# Patient Record
Sex: FEMALE | Race: WHITE | HISPANIC OR LATINO
[De-identification: names, ages, dates, MRNs, and addresses within clinical notes are randomized per-mention and may not be internally consistent; named-entity substitution may affect disease eponyms.]

---

## 2017-07-17 VITALS
WEIGHT: 185 LBS | SYSTOLIC BLOOD PRESSURE: 119 MMHG | RESPIRATION RATE: 16 BRPM | HEART RATE: 69 BPM | BODY MASS INDEX: 34.04 KG/M2 | TEMPERATURE: 96.8 F | DIASTOLIC BLOOD PRESSURE: 85 MMHG | HEIGHT: 62 IN

## 2017-11-01 VITALS
WEIGHT: 179 LBS | HEART RATE: 95 BPM | BODY MASS INDEX: 32.94 KG/M2 | DIASTOLIC BLOOD PRESSURE: 86 MMHG | SYSTOLIC BLOOD PRESSURE: 110 MMHG | HEIGHT: 62 IN

## 2017-11-28 ENCOUNTER — RECORD ABSTRACTING (OUTPATIENT)
Age: 42
End: 2017-11-28

## 2017-11-28 DIAGNOSIS — E11.9 TYPE 2 DIABETES MELLITUS W/OUT COMPLICATIONS: ICD-10-CM

## 2017-11-28 DIAGNOSIS — Z86.39 PERSONAL HISTORY OF OTHER ENDOCRINE, NUTRITIONAL AND METABOLIC DISEASE: ICD-10-CM

## 2017-12-21 ENCOUNTER — APPOINTMENT (OUTPATIENT)
Dept: ENDOCRINOLOGY | Facility: CLINIC | Age: 42
End: 2017-12-21
Payer: COMMERCIAL

## 2017-12-21 VITALS
HEIGHT: 62 IN | WEIGHT: 180 LBS | RESPIRATION RATE: 16 BRPM | HEART RATE: 73 BPM | BODY MASS INDEX: 33.13 KG/M2 | TEMPERATURE: 98 F | OXYGEN SATURATION: 99 % | SYSTOLIC BLOOD PRESSURE: 116 MMHG | DIASTOLIC BLOOD PRESSURE: 70 MMHG

## 2017-12-21 DIAGNOSIS — E78.5 HYPERLIPIDEMIA, UNSPECIFIED: ICD-10-CM

## 2017-12-21 PROCEDURE — 99214 OFFICE O/P EST MOD 30 MIN: CPT

## 2018-01-06 ENCOUNTER — RX RENEWAL (OUTPATIENT)
Age: 43
End: 2018-01-06

## 2018-02-22 ENCOUNTER — APPOINTMENT (OUTPATIENT)
Dept: ENDOCRINOLOGY | Facility: CLINIC | Age: 43
End: 2018-02-22

## 2018-03-02 ENCOUNTER — RX RENEWAL (OUTPATIENT)
Age: 43
End: 2018-03-02

## 2018-03-19 ENCOUNTER — APPOINTMENT (OUTPATIENT)
Dept: ENDOCRINOLOGY | Facility: CLINIC | Age: 43
End: 2018-03-19

## 2018-04-06 ENCOUNTER — APPOINTMENT (OUTPATIENT)
Dept: ENDOCRINOLOGY | Facility: CLINIC | Age: 43
End: 2018-04-06

## 2018-04-09 ENCOUNTER — RX RENEWAL (OUTPATIENT)
Age: 43
End: 2018-04-09

## 2018-04-09 RX ORDER — LEVOTHYROXINE SODIUM 0.2 MG/1
200 TABLET ORAL
Qty: 30 | Refills: 3 | Status: COMPLETED | COMMUNITY
Start: 2017-12-21 | End: 2018-04-09

## 2018-08-16 ENCOUNTER — RX RENEWAL (OUTPATIENT)
Age: 43
End: 2018-08-16

## 2018-08-27 ENCOUNTER — RX RENEWAL (OUTPATIENT)
Age: 43
End: 2018-08-27

## 2018-09-25 ENCOUNTER — APPOINTMENT (OUTPATIENT)
Dept: ENDOCRINOLOGY | Facility: CLINIC | Age: 43
End: 2018-09-25
Payer: COMMERCIAL

## 2018-09-25 VITALS
BODY MASS INDEX: 30.55 KG/M2 | WEIGHT: 166 LBS | SYSTOLIC BLOOD PRESSURE: 155 MMHG | TEMPERATURE: 98.6 F | OXYGEN SATURATION: 97 % | RESPIRATION RATE: 16 BRPM | DIASTOLIC BLOOD PRESSURE: 95 MMHG | HEART RATE: 88 BPM | HEIGHT: 62 IN

## 2018-09-25 VITALS — SYSTOLIC BLOOD PRESSURE: 131 MMHG | DIASTOLIC BLOOD PRESSURE: 81 MMHG

## 2018-09-25 DIAGNOSIS — I10 ESSENTIAL (PRIMARY) HYPERTENSION: ICD-10-CM

## 2018-09-25 LAB — GLUCOSE BLDC GLUCOMTR-MCNC: 221

## 2018-09-25 PROCEDURE — 99214 OFFICE O/P EST MOD 30 MIN: CPT | Mod: 25

## 2018-09-25 PROCEDURE — 82962 GLUCOSE BLOOD TEST: CPT

## 2018-09-25 RX ORDER — CHOLECALCIFEROL (VITAMIN D3) 50 MCG
50 MCG TABLET ORAL
Qty: 90 | Refills: 3 | Status: ACTIVE | COMMUNITY
Start: 1900-01-01 | End: 1900-01-01

## 2018-09-25 RX ORDER — GLYBURIDE 5 MG/1
5 TABLET ORAL
Refills: 0 | Status: COMPLETED | COMMUNITY
End: 2018-09-25

## 2018-09-26 ENCOUNTER — TRANSCRIPTION ENCOUNTER (OUTPATIENT)
Age: 43
End: 2018-09-26

## 2018-10-26 ENCOUNTER — RX RENEWAL (OUTPATIENT)
Age: 43
End: 2018-10-26

## 2018-10-26 ENCOUNTER — OTHER (OUTPATIENT)
Age: 43
End: 2018-10-26

## 2018-11-29 ENCOUNTER — RX RENEWAL (OUTPATIENT)
Age: 43
End: 2018-11-29

## 2019-01-11 ENCOUNTER — RX CHANGE (OUTPATIENT)
Age: 44
End: 2019-01-11

## 2019-01-11 RX ORDER — LEVOTHYROXINE SODIUM 0.09 MG/1
88 TABLET ORAL DAILY
Qty: 90 | Refills: 3 | Status: COMPLETED | COMMUNITY
Start: 2018-08-27 | End: 2019-01-11

## 2019-01-11 RX ORDER — LEVOTHYROXINE SODIUM 0.15 MG/1
150 TABLET ORAL
Qty: 30 | Refills: 3 | Status: COMPLETED | COMMUNITY
Start: 2018-10-26 | End: 2019-01-11

## 2019-03-18 ENCOUNTER — RX RENEWAL (OUTPATIENT)
Age: 44
End: 2019-03-18

## 2019-03-19 ENCOUNTER — MEDICATION RENEWAL (OUTPATIENT)
Age: 44
End: 2019-03-19

## 2019-04-24 ENCOUNTER — RX RENEWAL (OUTPATIENT)
Age: 44
End: 2019-04-24

## 2019-05-15 ENCOUNTER — RX RENEWAL (OUTPATIENT)
Age: 44
End: 2019-05-15

## 2019-05-21 ENCOUNTER — RX RENEWAL (OUTPATIENT)
Age: 44
End: 2019-05-21

## 2019-05-21 RX ORDER — CANAGLIFLOZIN 100 MG/1
100 TABLET, FILM COATED ORAL DAILY
Qty: 90 | Refills: 3 | Status: COMPLETED | COMMUNITY
Start: 1900-01-01 | End: 2019-05-21

## 2019-05-25 ENCOUNTER — RX RENEWAL (OUTPATIENT)
Age: 44
End: 2019-05-25

## 2019-05-30 ENCOUNTER — APPOINTMENT (OUTPATIENT)
Dept: ENDOCRINOLOGY | Facility: CLINIC | Age: 44
End: 2019-05-30

## 2019-06-24 ENCOUNTER — APPOINTMENT (OUTPATIENT)
Dept: ENDOCRINOLOGY | Facility: CLINIC | Age: 44
End: 2019-06-24
Payer: COMMERCIAL

## 2019-06-24 VITALS
DIASTOLIC BLOOD PRESSURE: 88 MMHG | HEART RATE: 112 BPM | SYSTOLIC BLOOD PRESSURE: 122 MMHG | OXYGEN SATURATION: 89 % | WEIGHT: 178 LBS | HEIGHT: 62 IN | BODY MASS INDEX: 32.76 KG/M2 | TEMPERATURE: 98.5 F | RESPIRATION RATE: 16 BRPM

## 2019-06-24 PROCEDURE — 99214 OFFICE O/P EST MOD 30 MIN: CPT

## 2019-06-24 NOTE — PHYSICAL EXAM
[Alert] : alert [No Acute Distress] : no acute distress [Normal Sclera/Conjunctiva] : normal sclera/conjunctiva [PERRL] : pupils equal, round and reactive to light [Normal Outer Ear/Nose] : the ears and nose were normal in appearance [Normal Hearing] : hearing was normal [No Neck Mass] : no neck mass was observed [de-identified] : surgical scar, non tender

## 2019-06-24 NOTE — DATA REVIEWED
[No studies available for review at this time.] : No studies available for review at this time. [FreeTextEntry1] : The TSH is suppressed and the Free T4 is normal with low Tg

## 2019-06-24 NOTE — ASSESSMENT
[FreeTextEntry1] : Will repeat the Metastatic survey and ablation treatment if necessary\par To see Dr. Toscano for FNA biopsy thyroid lesion\par The diabetes is not well controlled\par Will start Glipizide ER 2.5 mg po QD\par Her LDL-C is elevated\par Will start Atorvastatin 10 mg po QD\par Will continue the same Levothyroxine dose

## 2019-06-24 NOTE — HISTORY OF PRESENT ILLNESS
[FreeTextEntry1] : Patient feels well but she is under stress, the last US thyroid revealed small nodule she did not see Dr. Toscano for the FNA biopsy. The TSH is well suppressed and the Free T4 is normal The Tg is low. She has had 2 metastatic surveys. The FBS and HbA1c are slightly elevated.

## 2019-09-19 ENCOUNTER — RX RENEWAL (OUTPATIENT)
Age: 44
End: 2019-09-19

## 2019-11-14 ENCOUNTER — APPOINTMENT (OUTPATIENT)
Dept: ENDOCRINOLOGY | Facility: CLINIC | Age: 44
End: 2019-11-14
Payer: COMMERCIAL

## 2019-11-14 PROCEDURE — 99214 OFFICE O/P EST MOD 30 MIN: CPT

## 2019-11-14 NOTE — HISTORY OF PRESENT ILLNESS
[FreeTextEntry1] : Patient feels well but the diabetes is not well controlled. The patient saw the Head and Neck surgeon Dr. Toscano, she has small lymph nodes in the US thyroid. Will order a PET scan whole body. Denies polyuria, polydipsia. No chest pain or palpitations. She has gained weight.

## 2019-11-14 NOTE — ASSESSMENT
[FreeTextEntry1] : The diabetes is not well controlled\par Patient is clinically euthyroid\par The US thyroid disclosed small lymph nodes.\par Will order a PET scan Whole Body\par Will order a diabetic work up\par Will order thyroid test and Tg\par Will continue same treatment until we get the results

## 2019-11-14 NOTE — PHYSICAL EXAM
[Alert] : alert [Normal Sclera/Conjunctiva] : normal sclera/conjunctiva [No Acute Distress] : no acute distress [Normal Hearing] : hearing was normal [Normal Outer Ear/Nose] : the ears and nose were normal in appearance [PERRL] : pupils equal, round and reactive to light [No Neck Mass] : no neck mass was observed [de-identified] : surgical scar, non tender

## 2019-11-19 ENCOUNTER — TRANSCRIPTION ENCOUNTER (OUTPATIENT)
Age: 44
End: 2019-11-19

## 2019-12-23 ENCOUNTER — APPOINTMENT (OUTPATIENT)
Dept: SURGERY | Facility: CLINIC | Age: 44
End: 2019-12-23
Payer: COMMERCIAL

## 2019-12-23 VITALS
WEIGHT: 177 LBS | OXYGEN SATURATION: 93 % | DIASTOLIC BLOOD PRESSURE: 82 MMHG | TEMPERATURE: 98.4 F | HEIGHT: 62 IN | BODY MASS INDEX: 32.57 KG/M2 | HEART RATE: 73 BPM | SYSTOLIC BLOOD PRESSURE: 116 MMHG

## 2019-12-23 DIAGNOSIS — Z83.3 FAMILY HISTORY OF DIABETES MELLITUS: ICD-10-CM

## 2019-12-23 DIAGNOSIS — Z78.9 OTHER SPECIFIED HEALTH STATUS: ICD-10-CM

## 2019-12-23 DIAGNOSIS — Z72.89 OTHER PROBLEMS RELATED TO LIFESTYLE: ICD-10-CM

## 2019-12-23 PROCEDURE — 99243 OFF/OP CNSLTJ NEW/EST LOW 30: CPT

## 2019-12-23 NOTE — PLAN
[FreeTextEntry1] : Ms. REMI GARY  is presenting  today for an evaluation .  she is doing well and offers no complaints.  Results of  her  physical examination findings were discussed in details.   She  was advised to have BL    breast US and Mammogram and return after the tests. Importance of monthly self-breast examination was reinforced.  Patient's questions and concerns addressed to patient's satisfaction.\par \par Vitamin E daily for breast pain \par Avoid caffein and Chocolates to prevent breast pain

## 2019-12-23 NOTE — PHYSICAL EXAM
[Oriented to Time] : oriented to time [Oriented to Place] : oriented to place [Oriented to Person] : oriented to person [Alert] : alert [Calm] : calm [de-identified] : She  is alert, well-groomed, and cheerful.\par   [de-identified] :   anicteric.  Nasal mucosa pink, septum midline. Oral mucosa pink.  Tongue midline, Pharynx without exudates.\par   [de-identified] : No chest deformity. Breast are symmetric, Normal contours. No nodules, masses, tenderness, or axillary or supraclavicular  adenopathy. No nipple discharge. no skin retraction \par   [de-identified] :  Neck supple. Trachea midline. Thyroid isthmus barely palpable, lobes not felt.\par

## 2019-12-23 NOTE — CONSULT LETTER
[Dear  ___] : Dear  [unfilled], [Consult Letter:] : I had the pleasure of evaluating your patient, [unfilled]. [Consult Closing:] : Thank you very much for allowing me to participate in the care of this patient.  If you have any questions, please do not hesitate to contact me. [Please see my note below.] : Please see my note below. [FreeTextEntry3] : Jessee Tavarez MD, FACS  [Sincerely,] : Sincerely,

## 2019-12-23 NOTE — HISTORY OF PRESENT ILLNESS
[de-identified] : Patient is a 44 year  year-old female  who was referred by Dr. Donis Us with the chief complaint of having a  Right  breast  pain for about 10 days . She  denies any trauma and She has no nipple discharge. She  denies any fever, night sweats or loss of appetite.    There is no family history of breast carcinoma.   Menarche at age 9 -3 para-1, and her last menstrual period was in 2019 Patient is on no hormonal replacement therapy.   Patient  had a mammogram  and Sono in 2019. she was told it was normal. she did not bring the report.

## 2020-01-23 ENCOUNTER — APPOINTMENT (OUTPATIENT)
Dept: SURGERY | Facility: CLINIC | Age: 45
End: 2020-01-23
Payer: COMMERCIAL

## 2020-01-23 VITALS
BODY MASS INDEX: 32.76 KG/M2 | OXYGEN SATURATION: 95 % | WEIGHT: 178 LBS | HEART RATE: 92 BPM | HEIGHT: 62 IN | SYSTOLIC BLOOD PRESSURE: 108 MMHG | DIASTOLIC BLOOD PRESSURE: 73 MMHG | TEMPERATURE: 98.2 F

## 2020-01-23 PROCEDURE — 99213 OFFICE O/P EST LOW 20 MIN: CPT

## 2020-01-23 NOTE — PLAN
[FreeTextEntry1] :  Ms. REMI GARY  was told significance of findings, options, risks and benefits were explained.  Informed consent for right breast lumpectomy and potential risks, benefits and alternatives (surgical options were discussed including non-surgical options or the option of no surgery) to the planned surgery were discussed in depth.  All surgical options were discussed including non-surgical treatments.  She wishes to proceed with surgery.  We will plan for surgery on at the next available date, pending any required insurance pre-certification or pre-approval. She agrees to obtain any necessary pre-operative evaluations and testing prior to surgery.\par Patient advised to seek immediate medical attention with any acute change in symptoms or with the development of any new or worsening symptoms.  Patient's questions and concerns addressed to patient's satisfaction, and patient verbalized an understanding of the information discussed.\par \par

## 2020-01-23 NOTE — DATA REVIEWED
[FreeTextEntry1] : 	\par Exam requested by:\par MEY DOMINGUEZ MD\par [id number] [facility], GROUND FLOOR\par [facility] [address]\par SITE PERFORMED: [place]\par SITE PHONE: [phone]\par Patient: RAMÓN MAN\par YOB: 1975\par Phone: [phone]\par MRN: [id number] Acc: [id number]\par Date of Exam: 01-\par  \par EXAM:  DIGITAL UNILATERAL RIGHT DIAGNOSTIC MAMMOGRAM WITH CAD AND TOMOSYNTHESIS AND BREAST ULTRASOUND\par \par HISTORY: Right nipple discharge for 2 months. Brown in color as per patient.\par \par CLINICAL BREAST EXAMINATION:  The patient reports that her last clinical breast exam was within the past year. \par \par COMPARISON:  The present examination has been compared to prior breast imaging studies dating back to 2016\par \par MAMMOGRAM:\par \par TECHNIQUE:  Full-field digital mammography of the right breast was obtained. Additional imaging is composed of: Right CC and ML magnification views were obtained.. CAD was utilized. Low-dose full-field digital breast tomosynthesis examination was performed with 3D acquisitions and C-View synthesized 2D reconstructed images.  \par \par FINDINGS:\par BREAST COMPOSITION:  The breasts are heterogeneously dense, which may obscure small masses.\par \par Round/oval equal masses in the right breast appear stable with a parenchymal pattern similar to 2016 mammogram given differences in technique. Diffuse benign-appearing calcifications are seen in the right breast. No suspicious mammographic findings.\par \par BREAST ULTRASOUND:  \par \par TECHNIQUE:  Complete bilateral breast ultrasound, with evaluation of the four quadrants, retroareolar regions and axillae, was performed. \par \par FINDINGS: Simple cyst, minimally complicated cyst, and cluster of microcysts are seen in both breasts. No suspicious sonographic findings. No axillary adenopathy.\par \par IMPRESSION: \par No suspicious mammographic or sonographic findings. Clinical follow-up is recommended for further management of right nipple discharge. \par \par FOLLOW-UP:  Clinical correlation and assessment. \par \par ASSESSMENT:  BI-RADS Category 2:  Benign.\par \par This examination should not preclude the clinical evaluation of a suspicious palpable abnormality.\par \par As per the FDA requirements, a layman's letter has been generated and sent to your patient stating the results and recommendations of this breast imaging study. We have entered your patient into our reminder system and will notify them when they are due for their next breast imaging exam. \par \par Thank you for the opportunity to participate in the care of this patient.  \par  \par Jina Kim MD  - Electronically Signed: 01- 12:00 PM \par Physician to Physician Direct Line is: [phone]\par

## 2020-01-23 NOTE — PHYSICAL EXAM
[Alert] : alert [Oriented to Person] : oriented to person [Oriented to Place] : oriented to place [Oriented to Time] : oriented to time [Calm] : calm [de-identified] :   anicteric.  Nasal mucosa pink, septum midline. Oral mucosa pink.  Tongue midline, Pharynx without exudates.\par   [de-identified] : She  is alert, well-groomed, and cheerful.\par   [de-identified] :  Neck supple. Trachea midline. Thyroid isthmus barely palpable, lobes not felt.\par   [de-identified] : No chest deformity. Breast are symmetric, Normal contours. No nodules, masses, tenderness, or axillary or supraclavicular  adenopathy. No nipple discharge. no skin retraction \par

## 2020-01-23 NOTE — HISTORY OF PRESENT ILLNESS
[de-identified] : This is  a 45 year   old patient presenting today for a breast exam and to discuss the results of her recent  breast imaging. Patient had aright  breast US and   right  breast Mammogram on 01/14/2020. Deemed BIRADS category 2. BL exams were ordered. Radiologist did not do BL because she is due to have it  in April 2020.  today  she reports right breast pain.    Ms. REMI GARY denies any trauma.  she   reports spontaneous right  nipple discharge.  she states she had a surgery 7 years ago for the same symptoms and was found to have intraductal papilloma. Patient denies any fever, night sweats or loss of appetite.    There is no family history of breast carcinoma.  Her last menstrual period was  12/2019.  Patient is on no hormonal replacement therapy.   [de-identified] : Patient reports no interval changes to her overall health status or medical history

## 2020-02-10 ENCOUNTER — OUTPATIENT (OUTPATIENT)
Dept: OUTPATIENT SERVICES | Facility: HOSPITAL | Age: 45
LOS: 1 days | End: 2020-02-10
Payer: COMMERCIAL

## 2020-02-10 VITALS
HEIGHT: 67 IN | RESPIRATION RATE: 16 BRPM | DIASTOLIC BLOOD PRESSURE: 82 MMHG | TEMPERATURE: 97 F | WEIGHT: 179.9 LBS | OXYGEN SATURATION: 98 % | HEART RATE: 89 BPM | SYSTOLIC BLOOD PRESSURE: 112 MMHG

## 2020-02-10 DIAGNOSIS — N64.52 NIPPLE DISCHARGE: ICD-10-CM

## 2020-02-10 DIAGNOSIS — Z98.891 HISTORY OF UTERINE SCAR FROM PREVIOUS SURGERY: Chronic | ICD-10-CM

## 2020-02-10 DIAGNOSIS — Z01.818 ENCOUNTER FOR OTHER PREPROCEDURAL EXAMINATION: ICD-10-CM

## 2020-02-10 DIAGNOSIS — Z98.890 OTHER SPECIFIED POSTPROCEDURAL STATES: Chronic | ICD-10-CM

## 2020-02-10 PROCEDURE — G0463: CPT

## 2020-02-10 RX ORDER — SODIUM CHLORIDE 9 MG/ML
3 INJECTION INTRAMUSCULAR; INTRAVENOUS; SUBCUTANEOUS EVERY 8 HOURS
Refills: 0 | Status: DISCONTINUED | OUTPATIENT
Start: 2020-02-14 | End: 2020-02-22

## 2020-02-10 NOTE — H&P PST ADULT - HISTORY OF PRESENT ILLNESS
45 o female with history of HTN, Thyroid cancer, Hypothyroidism, reports the above.  She is scheduled for Right Breast Lumpectomy on 2/14/20 45 o female with history of HTN, Thyroid cancer, DM, HLD, Hypothyroidism, reports the above.  She is scheduled for Right Breast Lumpectomy on 2/14/20

## 2020-02-10 NOTE — H&P PST ADULT - NSICDXFAMILYHX_GEN_ALL_CORE_FT
FAMILY HISTORY:  FH: hypertension, DM in mother.   Cancer in mother's side of family  FH: type 2 diabetes, Heart attack, HTN, in  father

## 2020-02-10 NOTE — H&P PST ADULT - NSICDXPASTMEDICALHX_GEN_ALL_CORE_FT
PAST MEDICAL HISTORY:  Diabetes mellitus     Hypothyroidism     Thyroid cancer PAST MEDICAL HISTORY:  Diabetes mellitus     Hyperlipidemia     Hypertension     Hypothyroidism     Thyroid cancer

## 2020-02-10 NOTE — H&P PST ADULT - NSANTHOSAYNRD_GEN_A_CORE
No. GERALDO screening performed.  STOP BANG Legend: 0-2 = LOW Risk; 3-4 = INTERMEDIATE Risk; 5-8 = HIGH Risk

## 2020-02-10 NOTE — H&P PST ADULT - REASON FOR ADMISSION
Drainage coming from right breast x 3 months ago, with some pain in right lateral breast.  Patient states had the same surgery in the left breast

## 2020-02-10 NOTE — H&P PST ADULT - NSICDXPASTSURGICALHX_GEN_ALL_CORE_FT
PAST SURGICAL HISTORY:  H/O  section x3    H/O lumpectomy Left breast    H/O ovarian cystectomy     S/P thyroidectomy

## 2020-02-10 NOTE — H&P PST ADULT - NEGATIVE ALLERGY TYPES
no reactions to insect bites/no reactions to animals/no outdoor environmental allergies/no indoor environmental allergies/no reactions to food

## 2020-02-13 ENCOUNTER — TRANSCRIPTION ENCOUNTER (OUTPATIENT)
Age: 45
End: 2020-02-13

## 2020-02-14 ENCOUNTER — APPOINTMENT (OUTPATIENT)
Dept: SURGERY | Facility: HOSPITAL | Age: 45
End: 2020-02-14

## 2020-02-14 ENCOUNTER — OUTPATIENT (OUTPATIENT)
Dept: OUTPATIENT SERVICES | Facility: HOSPITAL | Age: 45
LOS: 1 days | End: 2020-02-14
Payer: COMMERCIAL

## 2020-02-14 ENCOUNTER — RESULT REVIEW (OUTPATIENT)
Age: 45
End: 2020-02-14

## 2020-02-14 VITALS
SYSTOLIC BLOOD PRESSURE: 127 MMHG | TEMPERATURE: 99 F | OXYGEN SATURATION: 96 % | RESPIRATION RATE: 16 BRPM | DIASTOLIC BLOOD PRESSURE: 80 MMHG | HEART RATE: 88 BPM

## 2020-02-14 VITALS
SYSTOLIC BLOOD PRESSURE: 129 MMHG | DIASTOLIC BLOOD PRESSURE: 94 MMHG | WEIGHT: 179.9 LBS | TEMPERATURE: 98 F | HEART RATE: 105 BPM | HEIGHT: 67 IN | OXYGEN SATURATION: 99 % | RESPIRATION RATE: 18 BRPM

## 2020-02-14 DIAGNOSIS — Z98.890 OTHER SPECIFIED POSTPROCEDURAL STATES: Chronic | ICD-10-CM

## 2020-02-14 DIAGNOSIS — N64.52 NIPPLE DISCHARGE: ICD-10-CM

## 2020-02-14 DIAGNOSIS — Z98.891 HISTORY OF UTERINE SCAR FROM PREVIOUS SURGERY: Chronic | ICD-10-CM

## 2020-02-14 LAB
GLUCOSE BLDC GLUCOMTR-MCNC: 125 MG/DL — HIGH (ref 70–99)
HCG UR QL: NEGATIVE — SIGNIFICANT CHANGE UP

## 2020-02-14 PROCEDURE — 88307 TISSUE EXAM BY PATHOLOGIST: CPT

## 2020-02-14 PROCEDURE — 19301 PARTIAL MASTECTOMY: CPT | Mod: AS,RT

## 2020-02-14 PROCEDURE — 88307 TISSUE EXAM BY PATHOLOGIST: CPT | Mod: 26

## 2020-02-14 PROCEDURE — 82962 GLUCOSE BLOOD TEST: CPT

## 2020-02-14 PROCEDURE — 81025 URINE PREGNANCY TEST: CPT

## 2020-02-14 PROCEDURE — 19301 PARTIAL MASTECTOMY: CPT | Mod: RT

## 2020-02-14 PROCEDURE — 19120 REMOVAL OF BREAST LESION: CPT | Mod: RT

## 2020-02-14 RX ORDER — HYDROMORPHONE HYDROCHLORIDE 2 MG/ML
0.5 INJECTION INTRAMUSCULAR; INTRAVENOUS; SUBCUTANEOUS
Refills: 0 | Status: DISCONTINUED | OUTPATIENT
Start: 2020-02-14 | End: 2020-02-14

## 2020-02-14 RX ORDER — ONDANSETRON 8 MG/1
4 TABLET, FILM COATED ORAL ONCE
Refills: 0 | Status: DISCONTINUED | OUTPATIENT
Start: 2020-02-14 | End: 2020-02-14

## 2020-02-14 RX ORDER — ACETAMINOPHEN WITH CODEINE 300MG-30MG
1 TABLET ORAL
Qty: 10 | Refills: 0
Start: 2020-02-14

## 2020-02-14 RX ORDER — FENTANYL CITRATE 50 UG/ML
25 INJECTION INTRAVENOUS
Refills: 0 | Status: DISCONTINUED | OUTPATIENT
Start: 2020-02-14 | End: 2020-02-14

## 2020-02-14 RX ORDER — SODIUM CHLORIDE 9 MG/ML
1000 INJECTION, SOLUTION INTRAVENOUS
Refills: 0 | Status: DISCONTINUED | OUTPATIENT
Start: 2020-02-14 | End: 2020-02-14

## 2020-02-14 NOTE — ASU DISCHARGE PLAN (ADULT/PEDIATRIC) - ASU DC SPECIAL INSTRUCTIONSFT
follow-up with Dr. Tavarez in 1-2 weeks follow-up with Dr. Tavarez in 1-2 weeks  Do not lift arm above head until cleared by Dr Tavarez

## 2020-02-14 NOTE — ASU DISCHARGE PLAN (ADULT/PEDIATRIC) - CARE PROVIDER_API CALL
Jessee Tavarez)  Surgery  [address] [facility], [place] [address]  Phone: [phone]  Fax: [phone]  Follow Up Time:

## 2020-02-19 LAB — SURGICAL PATHOLOGY STUDY: SIGNIFICANT CHANGE UP

## 2020-02-24 ENCOUNTER — APPOINTMENT (OUTPATIENT)
Dept: SURGERY | Facility: CLINIC | Age: 45
End: 2020-02-24
Payer: COMMERCIAL

## 2020-02-24 VITALS
WEIGHT: 176 LBS | OXYGEN SATURATION: 98 % | BODY MASS INDEX: 32.39 KG/M2 | SYSTOLIC BLOOD PRESSURE: 110 MMHG | HEIGHT: 62 IN | TEMPERATURE: 98.3 F | DIASTOLIC BLOOD PRESSURE: 76 MMHG | HEART RATE: 77 BPM

## 2020-02-24 DIAGNOSIS — N64.52 NIPPLE DISCHARGE: ICD-10-CM

## 2020-02-24 PROCEDURE — 99024 POSTOP FOLLOW-UP VISIT: CPT

## 2020-02-24 NOTE — PHYSICAL EXAM
[de-identified] : She  is alert, well-groomed, and cheerful.\par   [de-identified] : right breast Surgical wound is healing well.   no signs of  inflammation or infection.

## 2020-02-24 NOTE — DATA REVIEWED
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

## 2020-02-24 NOTE — HISTORY OF PRESENT ILLNESS
[de-identified] : Ms. REMI GARY  is s/p Right breast lumpectomy on 02/14/2020. 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 Today  Ms. VELEZ offers no complaints. patient reports no fever, chills,  or  pain.  Her surgical wound is healing well. No signs of inflammation, infection or exudate. \par  \par

## 2020-02-24 NOTE — ASSESSMENT
[FreeTextEntry1] : Ms. VELEZ is doing well, with excellent post-operative recovery. The surgical incision is healing well and as expected. There is no evidence of infection or complication, and patient is progressing as expected. Post-operative wound care, activity, restrictions and precautions reinforced.  Pathology results were discussed in details. Patient's questions and concerns addressed to patient's satisfaction.\par

## 2020-02-25 PROBLEM — E11.9 TYPE 2 DIABETES MELLITUS WITHOUT COMPLICATIONS: Chronic | Status: ACTIVE | Noted: 2020-02-10

## 2020-02-25 PROBLEM — E78.5 HYPERLIPIDEMIA, UNSPECIFIED: Chronic | Status: ACTIVE | Noted: 2020-02-10

## 2020-02-25 PROBLEM — I10 ESSENTIAL (PRIMARY) HYPERTENSION: Chronic | Status: ACTIVE | Noted: 2020-02-10

## 2020-02-25 PROBLEM — C73 MALIGNANT NEOPLASM OF THYROID GLAND: Chronic | Status: ACTIVE | Noted: 2020-02-10

## 2020-02-25 PROBLEM — E03.9 HYPOTHYROIDISM, UNSPECIFIED: Chronic | Status: ACTIVE | Noted: 2020-02-10

## 2020-03-12 ENCOUNTER — APPOINTMENT (OUTPATIENT)
Dept: ENDOCRINOLOGY | Facility: CLINIC | Age: 45
End: 2020-03-12

## 2020-03-19 ENCOUNTER — APPOINTMENT (OUTPATIENT)
Dept: ENDOCRINOLOGY | Facility: CLINIC | Age: 45
End: 2020-03-19
Payer: COMMERCIAL

## 2020-03-19 VITALS
WEIGHT: 183 LBS | SYSTOLIC BLOOD PRESSURE: 115 MMHG | HEIGHT: 62 IN | RESPIRATION RATE: 16 BRPM | BODY MASS INDEX: 33.68 KG/M2 | TEMPERATURE: 98.3 F | DIASTOLIC BLOOD PRESSURE: 79 MMHG | HEART RATE: 87 BPM | OXYGEN SATURATION: 99 %

## 2020-03-19 DIAGNOSIS — Z85.850 PERSONAL HISTORY OF MALIGNANT NEOPLASM OF THYROID: ICD-10-CM

## 2020-03-19 LAB — GLUCOSE BLDC GLUCOMTR-MCNC: 142

## 2020-03-19 PROCEDURE — 82962 GLUCOSE BLOOD TEST: CPT | Mod: NC

## 2020-03-19 PROCEDURE — 99214 OFFICE O/P EST MOD 30 MIN: CPT | Mod: 25

## 2020-03-19 RX ORDER — ALBUTEROL 90 MCG
90 AEROSOL (GRAM) INHALATION
Refills: 0 | Status: ACTIVE | COMMUNITY

## 2020-03-19 NOTE — PHYSICAL EXAM
[Alert] : alert [No Acute Distress] : no acute distress [Normal Sclera/Conjunctiva] : normal sclera/conjunctiva [PERRL] : pupils equal, round and reactive to light [Normal Outer Ear/Nose] : the ears and nose were normal in appearance [Normal Hearing] : hearing was normal [No Neck Mass] : no neck mass was observed [de-identified] : surgical scar, non tender

## 2020-03-19 NOTE — HISTORY OF PRESENT ILLNESS
[FreeTextEntry1] : The patient is doing well. She denies any trouble swallowing, neck pain, heat intolerance, palpitations, hoarseness, fatigue. Her weight is unchanged. Her TSH is now elevated, the Free T4 is normal and the thyroglobulin is detectable/undetectable with negative Anti Tg Ab. Her calcium level is normal/low. She has had 3 Metastatic surveys and 2 Ablation treatment. The US thyroid was revealed lymph nodes with normal morphology. She is taking the Levothyroxine regularly and the Calcium supplements. A PET scan was order but never done. The FBS is slightly higher but the HbA1c is the same.\par \par

## 2020-03-19 NOTE — ASSESSMENT
[FreeTextEntry1] : The thyroid cancer is stable\par She is now hypothyroid according to the blood tests\par The diabetes remains not well controlled\par Advised to follow the diet and lose weight\par Will repeat the blood tests today patient will call me next week for results\par Will continue the same treatment in the meantime

## 2020-03-19 NOTE — DATA REVIEWED
[FreeTextEntry1] : The FBS and HbA1c are basically unchanged. The Thyroglobulin remains low but the TSH is elevated with normal Free T4. The triglycerides are elevated.  It is not clear if the results belong to the patient, they are very different from the previous one. Patient did not do the Total Body PET scan, the Radiology place was looking for authorization but the insurance said that she did not need it.

## 2020-03-20 LAB
ANION GAP SERPL CALC-SCNC: 12 MMOL/L
BUN SERPL-MCNC: 13 MG/DL
CALCIUM SERPL-MCNC: 9.4 MG/DL
CHLORIDE SERPL-SCNC: 99 MMOL/L
CHOLEST SERPL-MCNC: 265 MG/DL
CHOLEST/HDLC SERPL: 4.4 RATIO
CO2 SERPL-SCNC: 26 MMOL/L
CREAT SERPL-MCNC: 0.43 MG/DL
GLUCOSE SERPL-MCNC: 145 MG/DL
HDLC SERPL-MCNC: 61 MG/DL
LDLC SERPL CALC-MCNC: 151 MG/DL
POTASSIUM SERPL-SCNC: 4.5 MMOL/L
SODIUM SERPL-SCNC: 136 MMOL/L
T4 FREE SERPL-MCNC: 0.7 NG/DL
THYROGLOB AB SERPL-ACNC: <20 IU/ML
THYROGLOB SERPL-MCNC: 0.24 NG/ML
TRIGL SERPL-MCNC: 264 MG/DL
TSH SERPL-ACNC: 19.5 UIU/ML

## 2020-05-07 ENCOUNTER — APPOINTMENT (OUTPATIENT)
Dept: SURGERY | Facility: CLINIC | Age: 45
End: 2020-05-07

## 2020-07-13 ENCOUNTER — APPOINTMENT (OUTPATIENT)
Dept: ENDOCRINOLOGY | Facility: CLINIC | Age: 45
End: 2020-07-13
Payer: COMMERCIAL

## 2020-07-13 VITALS
SYSTOLIC BLOOD PRESSURE: 122 MMHG | HEART RATE: 104 BPM | HEIGHT: 62 IN | TEMPERATURE: 98.3 F | BODY MASS INDEX: 32.94 KG/M2 | RESPIRATION RATE: 17 BRPM | WEIGHT: 179 LBS | DIASTOLIC BLOOD PRESSURE: 76 MMHG

## 2020-07-13 DIAGNOSIS — E04.2 NONTOXIC MULTINODULAR GOITER: ICD-10-CM

## 2020-07-13 LAB — GLUCOSE BLDC GLUCOMTR-MCNC: 236

## 2020-07-13 PROCEDURE — 82962 GLUCOSE BLOOD TEST: CPT | Mod: NC

## 2020-07-13 PROCEDURE — 99214 OFFICE O/P EST MOD 30 MIN: CPT | Mod: 25

## 2020-07-13 RX ORDER — LEVOTHYROXINE SODIUM 0.2 MG/1
200 TABLET ORAL DAILY
Qty: 90 | Refills: 3 | Status: COMPLETED | COMMUNITY
End: 2020-07-13

## 2020-07-13 NOTE — HISTORY OF PRESENT ILLNESS
[FreeTextEntry1] : The patient is doing well. She denies any trouble swallowing, neck pain, heat intolerance, palpitations, hoarseness, fatigue. Her weight is decreased. Her TSH is well suppressed, the Free T4 is normal and the thyroglobulin is undetectable with negative Anti Tg Ab. Her calcium level is donavon. She saw Dr. Toscano and Dr. Saenz after the PET scan.\par \par

## 2020-07-13 NOTE — PHYSICAL EXAM
[Alert] : alert [No Acute Distress] : no acute distress [No Neck Mass] : no neck mass was observed [de-identified] : Surgical scar, no masses felt

## 2020-07-13 NOTE — ASSESSMENT
[FreeTextEntry1] : The thyroid cancer seems to be stable\par She is slightly hyperthyroid\par Will reduce levothyroxine to 188 mcg po QD\par The diabetes is improving\par Will continue the same treatment\par Will repeat the US thyroid\par Patient will call me back for results

## 2020-07-13 NOTE — DATA REVIEWED
[FreeTextEntry1] : The TSH is low with elevated Free t4. The Tg is suppressed. The HbA1c has improved. She has lost weight. She saw Dr. Toscano and Dany, no need to do a biopsy of the breast or thyroid gland.

## 2020-10-15 ENCOUNTER — APPOINTMENT (OUTPATIENT)
Dept: ENDOCRINOLOGY | Facility: CLINIC | Age: 45
End: 2020-10-15

## 2020-11-13 RX ORDER — LEVOTHYROXINE SODIUM 0.09 MG/1
88 TABLET ORAL DAILY
Qty: 90 | Refills: 3 | Status: COMPLETED | COMMUNITY
Start: 2020-07-13 | End: 2020-11-13

## 2021-01-04 ENCOUNTER — APPOINTMENT (OUTPATIENT)
Dept: ENDOCRINOLOGY | Facility: CLINIC | Age: 46
End: 2021-01-04
Payer: SELF-PAY

## 2021-01-04 VITALS
RESPIRATION RATE: 16 BRPM | OXYGEN SATURATION: 98 % | HEIGHT: 62 IN | HEART RATE: 95 BPM | SYSTOLIC BLOOD PRESSURE: 122 MMHG | TEMPERATURE: 97.6 F | DIASTOLIC BLOOD PRESSURE: 80 MMHG

## 2021-01-04 VITALS — BODY MASS INDEX: 33.47 KG/M2 | WEIGHT: 183 LBS

## 2021-01-04 LAB — GLUCOSE BLDC GLUCOMTR-MCNC: 170

## 2021-01-04 PROCEDURE — 99072 ADDL SUPL MATRL&STAF TM PHE: CPT

## 2021-01-04 PROCEDURE — 82962 GLUCOSE BLOOD TEST: CPT | Mod: NC

## 2021-01-04 PROCEDURE — 99214 OFFICE O/P EST MOD 30 MIN: CPT | Mod: 25

## 2021-01-04 NOTE — HISTORY OF PRESENT ILLNESS
[FreeTextEntry1] : The patient had a total thyroidectomy for thyroid cancer. She is doing well. Denies any trouble swallowing, neck pain, heat intolerance, palpitations, hoarseness, fatigue. Her weight has increased. Her TSH is increased, the Free T4 is normal and the thyroglobulin is undetectable with negative Anti Tg Ab. Her calcium level is normal. Taking the Levothyroxine regularly with water 1/2 hour before breakfast.\par \par

## 2021-01-04 NOTE — PHYSICAL EXAM
[Alert] : alert [No Acute Distress] : no acute distress [No Neck Mass] : no neck mass was observed [de-identified] : Surgical scar present

## 2021-01-04 NOTE — ASSESSMENT
[FreeTextEntry1] : The patient is clinically hypothyroid\par The thyroid cancer is stable\par The TSH is not well suppressed but the Thyroglobulin is undetectable with negative Antibodies\par Will increase the levothyroxine dose to 225 mcg po QD\par Will repeat the thyroid function tests\par Will repeat the US thyroid\par She has an appointment with the Head and Neck surgeon Dr. Toscano this month\par The diabetes has improved\par Will continue the same treatment\par \par

## 2021-01-14 ENCOUNTER — APPOINTMENT (OUTPATIENT)
Dept: ENDOCRINOLOGY | Facility: CLINIC | Age: 46
End: 2021-01-14

## 2021-04-05 ENCOUNTER — APPOINTMENT (OUTPATIENT)
Dept: ENDOCRINOLOGY | Facility: CLINIC | Age: 46
End: 2021-04-05
Payer: COMMERCIAL

## 2021-04-05 VITALS
OXYGEN SATURATION: 98 % | TEMPERATURE: 98.1 F | WEIGHT: 185 LBS | DIASTOLIC BLOOD PRESSURE: 77 MMHG | HEART RATE: 90 BPM | BODY MASS INDEX: 34.04 KG/M2 | RESPIRATION RATE: 16 BRPM | SYSTOLIC BLOOD PRESSURE: 119 MMHG | HEIGHT: 62 IN

## 2021-04-05 LAB — GLUCOSE BLDC GLUCOMTR-MCNC: 182

## 2021-04-05 PROCEDURE — 99214 OFFICE O/P EST MOD 30 MIN: CPT | Mod: 25

## 2021-04-05 PROCEDURE — 99072 ADDL SUPL MATRL&STAF TM PHE: CPT

## 2021-04-05 PROCEDURE — 82962 GLUCOSE BLOOD TEST: CPT | Mod: NC

## 2021-04-05 NOTE — HISTORY OF PRESENT ILLNESS
[FreeTextEntry1] : Patient has been under severe stress, her mother is ill. She is unable to follow the diet or exercise properly. She has gained weight. The diabetic control has deteriorated. Her TSH is now low with normal Free t4 and low Tg. She feels a little nervous sometimes she has palpitations.

## 2021-04-05 NOTE — DATA REVIEWED
[FreeTextEntry1] : The US thyroid is basically unchanged. The TSH is now low with normal free t4. Her calcium is normal. The FBS was slightly elevated and the HbA1c slightly higher than the previous visit. Her Tg are elevated.

## 2021-04-05 NOTE — ASSESSMENT
[FreeTextEntry1] : The patient is clinically slightly hyperthyroid\par The thyroid cancer is stable\par Will reduce the Levothyroxine dose to 200 mg po daily plus 1/2 tablet of the 25 mcg po QD\par She wants to see a different Head and Neck surgeon\par The diabetes has deteriorated slightly\par Advised to exercise and follow the diet\par Advised to lose weight\par Will add Tradjenta 5 mg po QD

## 2021-04-05 NOTE — PHYSICAL EXAM
[Alert] : alert [No Acute Distress] : no acute distress [No Neck Mass] : no neck mass was observed [No Respiratory Distress] : no respiratory distress [Clear to Auscultation] : lungs were clear to auscultation bilaterally [Normal PMI] : the apical impulse was normal [Normal Rate] : heart rate was normal [de-identified] : Surgical scat present

## 2021-04-28 RX ORDER — LINAGLIPTIN 5 MG/1
5 TABLET, FILM COATED ORAL
Qty: 90 | Refills: 3 | Status: COMPLETED | COMMUNITY
Start: 2021-04-05 | End: 2021-04-28

## 2021-06-08 ENCOUNTER — APPOINTMENT (OUTPATIENT)
Dept: ENDOCRINOLOGY | Facility: CLINIC | Age: 46
End: 2021-06-08
Payer: COMMERCIAL

## 2021-06-08 VITALS
HEIGHT: 62 IN | BODY MASS INDEX: 34.04 KG/M2 | OXYGEN SATURATION: 96 % | DIASTOLIC BLOOD PRESSURE: 79 MMHG | WEIGHT: 185 LBS | TEMPERATURE: 98 F | HEART RATE: 71 BPM | SYSTOLIC BLOOD PRESSURE: 108 MMHG | RESPIRATION RATE: 16 BRPM

## 2021-06-08 LAB — GLUCOSE BLDC GLUCOMTR-MCNC: 100

## 2021-06-08 PROCEDURE — 99072 ADDL SUPL MATRL&STAF TM PHE: CPT

## 2021-06-08 PROCEDURE — 99214 OFFICE O/P EST MOD 30 MIN: CPT | Mod: 25

## 2021-06-08 PROCEDURE — 82962 GLUCOSE BLOOD TEST: CPT | Mod: NC

## 2021-06-08 NOTE — REASON FOR VISIT
[Follow - Up] : a follow-up visit [Hypothyroidism] : hypothyroidism [Thyroid Cancer] : thyroid cancer [DM Type 2] : DM Type 2

## 2021-06-08 NOTE — HISTORY OF PRESENT ILLNESS
[FreeTextEntry1] : Patient feels well , she is asymptomatic. Her weight has not changed. She is exercising regularly and following the diet. Her blood glucose at home are not/well controlled, they are usually around 110 mg/dl. The FBS in the laboratory was 70 mg/dl and the HbA1c was 6.5 %, improved since last visit. The renal function is within normal limits, the microalbumin in the urine was normal. The lipid panel was within normal limits. She denies low blood glucose during the night. She denies chest pain, or SOB. Denies numbness, tingling or burning sensation on her extremities. Taking her medications regularly.\par

## 2021-06-08 NOTE — PHYSICAL EXAM
[Alert] : alert [No Acute Distress] : no acute distress [No Neck Mass] : no neck mass was observed [No Respiratory Distress] : no respiratory distress [Clear to Auscultation] : lungs were clear to auscultation bilaterally [Normal PMI] : the apical impulse was normal [Normal Rate] : heart rate was normal [de-identified] : Surgical scat present

## 2021-06-08 NOTE — DATA REVIEWED
[No studies available for review at this time.] : No studies available for review at this time. [FreeTextEntry1] : The FBS and HbA1c indicates very good diabetic control. The renal function is fine. The TSH was not well suppressed. She has been taking Levothyroxine 225 mcg po QD (100 mcg 1/2 tablet (50 mcg) plus 175 mcg po QD)

## 2021-06-08 NOTE — ASSESSMENT
[FreeTextEntry1] : The thyroid cancer is stable\par The TSH is not well suppressed and the Thyroglobulin is undetectable with negative Antibodies\par Will increased Levothyroxine to 250 mcg po QD\par Will repeat the blood tests in 4 weeks\par The diabetes is well controlled\par Will continue the same treatment\par

## 2021-07-29 ENCOUNTER — APPOINTMENT (OUTPATIENT)
Dept: ENDOCRINOLOGY | Facility: CLINIC | Age: 46
End: 2021-07-29
Payer: COMMERCIAL

## 2021-07-29 PROCEDURE — 99443: CPT

## 2021-07-29 RX ORDER — SIMVASTATIN 10 MG/1
10 TABLET, FILM COATED ORAL
Qty: 90 | Refills: 3 | Status: COMPLETED | COMMUNITY
End: 2021-07-29

## 2021-07-29 NOTE — DATA REVIEWED
[FreeTextEntry1] : The FBS and HbA1c indicates good diabetic control. The Free t4 is normal but the TSH remains elevated. The thyroglobulin is low. Her calcium is slightly low. The lipid panel was abnormal.

## 2021-07-29 NOTE — HISTORY OF PRESENT ILLNESS
[Home] : at home, [unfilled] , at the time of the visit. [Medical Office: ([facility])___] : at the medical office located in  [Verbal consent obtained from patient] : the patient, [unfilled] [FreeTextEntry1] : The patient had a total thyroidectomy for thyroid cancer. She is doing well. Denies any trouble swallowing, neck pain, heat intolerance, palpitations, hoarseness, fatigue. Her weight is unchanged. Her TSH is not well suppressed, the Free T4 is normal and the thyroglobulin is undetectable with negative Anti Tg Ab. Her calcium level was low, advised to take the calcium pills twice daily.  The US thyroid was done 3/21. She is taking the Levothyroxine regularly and the Calcium supplements.\par \par

## 2021-07-29 NOTE — ASSESSMENT
[FreeTextEntry1] : The patient is still hypothyroid\par Will increase the dose of Levothyroxine\par The thyroglobulin is elevated\par The diabetes is well controlled\par Will continue the same treatment\par She recently was given steroid for bronchitis secondary to COVID\par Informed that her diabetes will get worse with the steroids\par The Atorvastatin was raised to 20 mg po QD because the LDL-C remains elevated

## 2021-07-29 NOTE — PHYSICAL EXAM
[Alert] : alert [No Acute Distress] : no acute distress [No Neck Mass] : no neck mass was observed [No Respiratory Distress] : no respiratory distress [Clear to Auscultation] : lungs were clear to auscultation bilaterally [Normal PMI] : the apical impulse was normal [Normal Rate] : heart rate was normal [de-identified] : Surgical scat present

## 2021-10-04 ENCOUNTER — APPOINTMENT (OUTPATIENT)
Dept: ENDOCRINOLOGY | Facility: CLINIC | Age: 46
End: 2021-10-04

## 2021-11-01 ENCOUNTER — APPOINTMENT (OUTPATIENT)
Dept: SURGERY | Facility: CLINIC | Age: 46
End: 2021-11-01
Payer: COMMERCIAL

## 2021-11-01 VITALS
SYSTOLIC BLOOD PRESSURE: 113 MMHG | HEIGHT: 62 IN | DIASTOLIC BLOOD PRESSURE: 79 MMHG | WEIGHT: 185 LBS | HEART RATE: 110 BPM | BODY MASS INDEX: 34.04 KG/M2

## 2021-11-01 VITALS — TEMPERATURE: 93.7 F

## 2021-11-01 DIAGNOSIS — C95.90 LEUKEMIA, UNSPECIFIED NOT HAVING ACHIEVED REMISSION: ICD-10-CM

## 2021-11-01 DIAGNOSIS — N64.4 MASTODYNIA: ICD-10-CM

## 2021-11-01 PROCEDURE — 99213 OFFICE O/P EST LOW 20 MIN: CPT

## 2021-11-01 NOTE — PHYSICAL EXAM
[Alert] : alert [Oriented to Person] : oriented to person [Oriented to Place] : oriented to place [Oriented to Time] : oriented to time [Calm] : calm [de-identified] : She  is alert, well-groomed, and in NAD\par   [de-identified] : anicteric.  Nasal mucosa pink, septum midline. Oral mucosa pink.  Tongue midline, Pharynx without exudates.\par   [de-identified] : Neck supple. Trachea midline. Thyroid isthmus barely palpable, lobes not felt.\par   [de-identified] : No chest deformity. Breast are symmetric, Normal contours. No nodules, masses, tenderness, or axillary or supraclavicular adenopathy. No nipple discharge. no skin retraction

## 2021-11-01 NOTE — HISTORY OF PRESENT ILLNESS
[de-identified] :  This is  a 46 year   old patient presenting today for a breast exam. She is complaining of BL breast pain on and off. Patient had her last breast imaging 2 years ago.  Ms. VELEZ denies any trauma and she has no nipple discharge. Patient denies any fever, night sweats or loss of appetite.    There is no family history of breast carcinoma.  \par \par PERTINENT HISTORY: \par Ms. REMI GARY  is s/p Right breast lumpectomy on 02/14/2020. Patient's pathology results were  consistent with Intraductal papillomatosis.  [de-identified] : Patient reports no interval changes to her overall health status or medical history

## 2021-11-01 NOTE — PLAN
[FreeTextEntry1] : Ms. VELEZ  is presenting  today for an evaluation .  she is doing well and offers no complaints.  Results of  her recent  imaging and physical examination findings were discussed in details.   She  was advised to have BL   breast US and Mammogram  and return after the tests. Importance of monthly self-breast examination was reinforced.  Patient's questions and concerns addressed to patient's satisfaction.\par \par Vitamin E daily for breast pain \par Avoid caffein and Chocolates to prevent breast pain

## 2021-11-11 ENCOUNTER — APPOINTMENT (OUTPATIENT)
Dept: ENDOCRINOLOGY | Facility: CLINIC | Age: 46
End: 2021-11-11
Payer: COMMERCIAL

## 2021-11-11 VITALS
SYSTOLIC BLOOD PRESSURE: 110 MMHG | RESPIRATION RATE: 16 BRPM | OXYGEN SATURATION: 97 % | BODY MASS INDEX: 32.76 KG/M2 | TEMPERATURE: 98 F | WEIGHT: 178 LBS | HEIGHT: 62 IN | DIASTOLIC BLOOD PRESSURE: 73 MMHG | HEART RATE: 90 BPM

## 2021-11-11 DIAGNOSIS — E55.9 VITAMIN D DEFICIENCY, UNSPECIFIED: ICD-10-CM

## 2021-11-11 PROCEDURE — 99214 OFFICE O/P EST MOD 30 MIN: CPT

## 2021-11-11 NOTE — HISTORY OF PRESENT ILLNESS
[FreeTextEntry1] : The patient had a total for thyroid cancer. She has been feeling weak for a couple of days.. Denies any trouble swallowing, neck pain, heat intolerance, palpitations, hoarseness, fatigue. Her weight has decreased. Her TSH is now low, the Free T4 is normal and the thyroglobulin is undetectable with negative Anti Tg Ab. Her calcium level is normal.  The US thyroid was unchanged 3/21. She is taking the Levothyroxine regularly and the Calcium supplements.\par \par

## 2021-11-11 NOTE — REASON FOR VISIT
[Follow - Up] : a follow-up visit [DM Type 2] : DM Type 2 [Hypothyroidism] : hypothyroidism [Thyroid Cancer] : thyroid cancer [Other___] : [unfilled]

## 2021-11-11 NOTE — ASSESSMENT
[FreeTextEntry1] : The diabetes is well controlled\par She has lost weight\par The TSH is low\par Will reduce the Lavothyroxine dose\par Advised to see the Head and Neck surgeon Dr. Toscano\par Given copy of the US thyroid\par Will repeat the blood testing before next visit

## 2021-11-11 NOTE — PHYSICAL EXAM
[Alert] : alert [No Acute Distress] : no acute distress [No Neck Mass] : no neck mass was observed [No Respiratory Distress] : no respiratory distress [Clear to Auscultation] : lungs were clear to auscultation bilaterally [Normal PMI] : the apical impulse was normal [Normal Rate] : heart rate was normal [de-identified] : Surgical scat present

## 2021-11-11 NOTE — DATA REVIEWED
[FreeTextEntry1] : The TSH is very low with low Tg. The FBS and HbA1c indicates good diabetic control.

## 2021-12-02 PROBLEM — N63.20 BREAST MASS, LEFT: Status: ACTIVE | Noted: 2021-12-02

## 2021-12-06 ENCOUNTER — APPOINTMENT (OUTPATIENT)
Dept: SURGERY | Facility: CLINIC | Age: 46
End: 2021-12-06
Payer: COMMERCIAL

## 2021-12-06 VITALS
BODY MASS INDEX: 32.76 KG/M2 | DIASTOLIC BLOOD PRESSURE: 70 MMHG | HEIGHT: 62 IN | WEIGHT: 178 LBS | OXYGEN SATURATION: 99 % | SYSTOLIC BLOOD PRESSURE: 115 MMHG | HEART RATE: 75 BPM

## 2021-12-06 VITALS — TEMPERATURE: 97.2 F

## 2021-12-06 DIAGNOSIS — N63.20 UNSPECIFIED LUMP IN THE LEFT BREAST, UNSPECIFIED QUADRANT: ICD-10-CM

## 2021-12-06 PROCEDURE — 99213 OFFICE O/P EST LOW 20 MIN: CPT

## 2021-12-07 ENCOUNTER — RESULT REVIEW (OUTPATIENT)
Age: 46
End: 2021-12-07

## 2021-12-16 ENCOUNTER — APPOINTMENT (OUTPATIENT)
Dept: SURGERY | Facility: CLINIC | Age: 46
End: 2021-12-16
Payer: COMMERCIAL

## 2021-12-16 VITALS — TEMPERATURE: 96.6 F

## 2021-12-16 PROCEDURE — 99213 OFFICE O/P EST LOW 20 MIN: CPT

## 2021-12-16 NOTE — HISTORY OF PRESENT ILLNESS
[de-identified] : This is  a 46 year   old patient presenting today for a breast exam and to discuss the results of her recent  breast biopsy. Patient had a BL breast US and   BL breast Mammogram on 11/26/2021 Deemed BIRADS category 4 and showed irregular hypoechoic mass measuring 0.4 x 0.4 x 0.4 cm In the left breast at 1 o'clock, 3 cm from the nipple. Ms. VELEZ  is s/p sono-guided biopsy left breast on 12/07/2021. Patient's pathology results were  consistent with Intraductal papilloma with florid proliferative change and sclerosis.  The pathology results are concordant with the imaging. Ms. VELEZ denies any trauma and she has no nipple discharge. Patient denies any fever, night sweats or loss of appetite Patient is on no hormonal replacement therapy.  she is here to discuss her surgery \par \par PERTINENT HISTORY: \par Ms. REMI GARY is s/p BL breast lumpectomy  24 years ago . 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  [de-identified] :  Patient reports no interval changes to her overall health status or medical history

## 2021-12-16 NOTE — PLAN
[FreeTextEntry1] : Ms. VELEZ  was told significance of findings, options, risks and benefits were explained.  Informed consent for excision left breast mass with spot localization and potential risks, benefits and alternatives (surgical options were discussed including non-surgical options or the option of no surgery) to the planned surgery were discussed in depth.  All surgical options were discussed including non-surgical treatments.  She wishes to proceed with surgery.  We will plan for surgery on at the next available date, pending any required insurance pre-certification or pre-approval. She agrees to obtain any necessary pre-operative evaluations and testing prior to surgery.\par Patient advised to seek immediate medical attention with any acute change in symptoms or with the development of any new or worsening symptoms.  Patient's questions and concerns addressed to patient's satisfaction, and patient verbalized an understanding of the information discussed.\par \par

## 2021-12-16 NOTE — DATA REVIEWED
[FreeTextEntry1] : 	\par Exam requested by:\par MEY DOMINGUEZ MD\par [address] [facility] [place]\par [facility] [address]\par SITE PERFORMED: FLUSHING\par SITE PHONE: [phone]\par Patient: RAMÓN MAN\par YOB: 1975\par Phone: [phone]\par MRN: [id number] Acc: [id number]\par Date of Exam: 12-\par  \par Addendum 1 - 12-\par  \par Addendum:\par \par Pathology results from [facility]:\par \par Left 1:00, sono-guided biopsy: Intraductal papilloma with florid proliferative change and sclerosis.\par \par The pathology results are concordant with the imaging. \par \par Follow-up recommendations: Surgical consultation for wider excision given the presence of a high risk lesion, an intraductal papilloma. \par \par \par Results were forwarded to the ordering physician by the department staff.\par \par Thank you for the opportunity to participate in the care of this patient.  \par  \par Kris Alfred MD  - Electronically Signed: 12- 12:20 PM \par Physician to Physician Direct Line is: [phone]\par Original Report\par EXAM: ULTRASOUND-GUIDED CORE BIOPSY 1 SITE\par \par HISTORY: The patient is 46 years old and is referred for an ultrasound-guided needle biopsy:\par Left 1:00 N 3 cm, 4 mm nodule\par \par COMPARISON:  Prior breast imaging studies dated November 2021 \par \par PROCEDURE: The risks and benefits of the procedure were conveyed to the patient, and the patient consented to the procedure. Universal timeout was performed. Targeted ultrasound performed prior to the procedure reconfirms the suspicious lesion. \par \par Using sterile technique, 7 mL of 1% lidocaine was administered. Under sonographic visualization and utilizing an introducer, a 14-gauge Bowman Powere spring-loaded core biopsy device was used to sample the target. Multiple samples were obtained. A cylinder shaped biopsy marker was deployed at the biopsy site. A sonographically visualized residual lesion was not present after sampling. \par \par A postprocedure mammogram demonstrates appropriate placement of the marker.\par \par The patient tolerated the procedure well without complications. The patient was given postbiopsy care instructions. The specimen was subsequently sent to the pathology lab. \par \par IMPRESSION: 1 site ultrasound-guided core biopsy was performed. \par \par Pathology results and concordance will follow as an addendum to this report. \par \par Thank you for the opportunity to participate in the care of this patient.  \par  \par Kris Alfred MD  - Electronically Signed: 12- 5:05 PM \par Physician to Physician Direct Line is: [phone]

## 2021-12-16 NOTE — PHYSICAL EXAM
[Alert] : alert [Oriented to Person] : oriented to person [Oriented to Place] : oriented to place [Oriented to Time] : oriented to time [Calm] : calm [de-identified] : She  is alert, well-groomed, and in NAD\par   [de-identified] : anicteric.  Nasal mucosa pink, septum midline. Oral mucosa pink.  Tongue midline, Pharynx without exudates.\par   [de-identified] : Neck supple. Trachea midline. Thyroid isthmus barely palpable, lobes not felt.\par   [de-identified] : No chest deformity. Breast are symmetric, Normal contours. No nodules, masses, tenderness, or axillary or supraclavicular adenopathy. No nipple discharge. no skin retraction

## 2021-12-16 NOTE — PHYSICAL EXAM
[Alert] : alert [Oriented to Person] : oriented to person [Oriented to Place] : oriented to place [Oriented to Time] : oriented to time [Calm] : calm [de-identified] : She  is alert, well-groomed, and in NAD\par   [de-identified] : anicteric.  Nasal mucosa pink, septum midline. Oral mucosa pink.  Tongue midline, Pharynx without exudates.\par   [de-identified] : Neck supple. Trachea midline. Thyroid isthmus barely palpable, lobes not felt.\par   [de-identified] : No chest deformity. Breast are symmetric, Normal contours. No nodules, masses, tenderness, or axillary or supraclavicular adenopathy. No nipple discharge. no skin retraction

## 2021-12-16 NOTE — HISTORY OF PRESENT ILLNESS
[de-identified] : This is  a 46 year   old patient presenting today for a breast exam and to discuss the results of her recent  breast imaging. Patient had a BL breast US and   BL breast Mammogram on 11/26/2021 Deemed BIRADS category 4 and showed irregular hypoechoic mass measuring 0.4 x 0.4 x 0.4 cm In the left breast at 1 o'clock, 3 cm from the nipple.Ms. VELEZ denies any trauma and she has no nipple discharge. Patient denies any fever, night sweats or loss of appetite. There is no family history of breast carcinoma. \par \par \par PERTINENT HISTORY: \par Ms. REMI GARY is s/p BL breast lumpectomy  24 years ago . 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  [de-identified] : Patient reports no interval changes to her overall health status or medical history

## 2021-12-16 NOTE — DATA REVIEWED
[FreeTextEntry1] : 	\par Exam requested by:\par MEY DOMINGUEZ MD\par [address] [facility] [place]\par [facility] [address]\par SITE PERFORMED: [place]\par SITE PHONE: [phone]\par Patient: RAMÓN MAN\par YOB: 1975\par Phone: [phone]\par MRN: [id number] Acc: [id number]\par Date of Exam: 11-\par  \par EXAM: DIGITAL BILATERAL SCREENING MAMMOGRAM WITH CAD AND TOMOSYNTHESIS AND BREAST ULTRASOUND\par \par HISTORY: The patient is 46 years old and is seen for a screening mammogram. Family history of breast cancer: None. \par \par CLINICAL BREAST EXAMINATION: The patient reports that her last clinical breast exam was within the past year. \par \par COMPARISON: The present examination has been compared to prior breast imaging studies dating back to 2013.\par \par MAMMOGRAM:\par \par TECHNIQUE: The following views were obtained digitally: bilateral craniocaudal, bilateral mediolateral oblique. Low-dose full-field digital breast tomosynthesis examination was performed with tomosynthesis acquisitions and synthesized 2D reconstructed mammogram. Computer-aided detection (CAD) was utilized.  \par \par FINDINGS: \par BREAST COMPOSITION: The breasts are heterogeneously dense, which may obscure small masses.\par \par No suspicious masses, architectural distortion, or significant calcifications are detected.\par \par BREAST ULTRASOUND:\par \par HISTORY: Supplemental screening evaluation of dense breasts.\par \par TECHNIQUE: A bilateral breast ultrasound was performed with complete evaluation of the four quadrants, retroareolar region, and axilla. \par \par FINDINGS: \par In the left breast at 1 o'clock, 3 cm from the nipple there is an irregular hypoechoic mass measuring 0.4 x 0.4 x 0.4 cm. Scattered benign cysts are noted bilaterally. \par \par No suspicious solid or complex cystic mass is detected in the right breast. There is no lymphadenopathy in the axillae.\par \par IMPRESSION: Indeterminate mass in the left breast at 1 o'clock. Ultrasound-guided biopsy is recommended for further evaluation. \par \par FOLLOW-UP: Ultrasound guided biopsy.\par \par \par ASSESSMENT: BI-RADS Category 4:  Suspicious. \par \par This examination should not preclude the clinical evaluation of a suspicious palpable abnormality.\par \par As per the FDA requirements, a layman's letter has been generated and sent to your patient stating the results and recommendations of this breast imaging study. We have entered your patient into our reminder system and will notify them when they are due for their next breast imaging exam. \par \par Thank you for the opportunity to participate in the care of this patient.  \par  \par JERICA MENARD MD  - Electronically Signed: 11- 3:11 PM \par Physician to Physician Direct Line is: \par

## 2021-12-16 NOTE — PLAN
[FreeTextEntry1] : Ms. VELEZ  is presenting  today for an evaluation .  she is doing well and offers no complaints.  Results of  her recent  imaging and physical examination findings were discussed in details.   She  was advised to have       Left        breast US guided biopsy and return after the tests. Importance of monthly self-breast examination was reinforced.  Patient's questions and concerns addressed to patient's satisfaction.\par

## 2022-01-04 ENCOUNTER — OUTPATIENT (OUTPATIENT)
Dept: OUTPATIENT SERVICES | Facility: HOSPITAL | Age: 47
LOS: 1 days | End: 2022-01-04

## 2022-01-04 DIAGNOSIS — Z98.891 HISTORY OF UTERINE SCAR FROM PREVIOUS SURGERY: Chronic | ICD-10-CM

## 2022-01-04 DIAGNOSIS — Z98.890 OTHER SPECIFIED POSTPROCEDURAL STATES: Chronic | ICD-10-CM

## 2022-01-04 DIAGNOSIS — N63.20 UNSPECIFIED LUMP IN THE LEFT BREAST, UNSPECIFIED QUADRANT: ICD-10-CM

## 2022-01-06 ENCOUNTER — OUTPATIENT (OUTPATIENT)
Dept: OUTPATIENT SERVICES | Facility: HOSPITAL | Age: 47
LOS: 1 days | End: 2022-01-06
Payer: COMMERCIAL

## 2022-01-06 VITALS
OXYGEN SATURATION: 99 % | DIASTOLIC BLOOD PRESSURE: 72 MMHG | TEMPERATURE: 98 F | SYSTOLIC BLOOD PRESSURE: 123 MMHG | HEIGHT: 62 IN | RESPIRATION RATE: 18 BRPM | WEIGHT: 179.9 LBS | HEART RATE: 81 BPM

## 2022-01-06 DIAGNOSIS — Z91.89 OTHER SPECIFIED PERSONAL RISK FACTORS, NOT ELSEWHERE CLASSIFIED: ICD-10-CM

## 2022-01-06 DIAGNOSIS — Z98.891 HISTORY OF UTERINE SCAR FROM PREVIOUS SURGERY: Chronic | ICD-10-CM

## 2022-01-06 DIAGNOSIS — I10 ESSENTIAL (PRIMARY) HYPERTENSION: ICD-10-CM

## 2022-01-06 DIAGNOSIS — E03.9 HYPOTHYROIDISM, UNSPECIFIED: ICD-10-CM

## 2022-01-06 DIAGNOSIS — Z01.818 ENCOUNTER FOR OTHER PREPROCEDURAL EXAMINATION: ICD-10-CM

## 2022-01-06 DIAGNOSIS — N63.20 UNSPECIFIED LUMP IN THE LEFT BREAST, UNSPECIFIED QUADRANT: ICD-10-CM

## 2022-01-06 DIAGNOSIS — Z98.890 OTHER SPECIFIED POSTPROCEDURAL STATES: Chronic | ICD-10-CM

## 2022-01-06 PROCEDURE — G0463: CPT

## 2022-01-06 RX ORDER — CANAGLIFLOZIN 100 MG/1
1 TABLET, FILM COATED ORAL
Qty: 0 | Refills: 0 | DISCHARGE

## 2022-01-06 RX ORDER — LEVOTHYROXINE SODIUM 125 MCG
1 TABLET ORAL
Qty: 0 | Refills: 0 | DISCHARGE

## 2022-01-06 NOTE — H&P PST ADULT - NSICDXFAMILYHX_GEN_ALL_CORE_FT
FAMILY HISTORY:  FH: hypertension, DM in mother.   Cancer in mother's side of family  FH: type 2 diabetes, Heart attack, HTN, in  father     FAMILY HISTORY:  FH: hypertension, DM in mother.   Cancer in mother's side of family  FH: type 2 diabetes, Heart attack, HTN, in  father    Mother  Still living? No  FH: hypertension, Age at diagnosis: Age Unknown  FH: type 2 diabetes, Age at diagnosis: Age Unknown    Grandparent  Still living? No  FH: type 2 diabetes, Age at diagnosis: Age Unknown

## 2022-01-06 NOTE — H&P PST ADULT - PROBLEM SELECTOR PLAN 1
Scheduled for excision of left breast mass with spot localization 1/14/2022  Preoperative instructions discussed and given to patient.   NPO after midnight the day before surgery  Follow up with PCP/Consultants for optimization prior to surgery as needed.   Complete  COVID 19 test 3 days prior to surgery,    Use chlorhexidine solution/dial soap as discussed for preprocedure skin preparation the morning of surgery.   Patient verbalized understanding of instructions  Return to PCP for medical optimization  PST to obtain labs inclusive of A1C and TSH/ekg/cxr/mc from PCP's office  Patient did not want to repeat studies done at PCP's office in PST today

## 2022-01-06 NOTE — H&P PST ADULT - NSICDXPASTSURGICALHX_GEN_ALL_CORE_FT
PAST SURGICAL HISTORY:  H/O  section x3    H/O lumpectomy Left breast    H/O ovarian cystectomy     S/P thyroidectomy 2015 iwith excision of recurrent thyroid nodules (2016, 2017, 2018)

## 2022-01-06 NOTE — H&P PST ADULT - NSICDXPASTMEDICALHX_GEN_ALL_CORE_FT
PAST MEDICAL HISTORY:  COVID-19 viral infection 6/2021 - fully recovered    Diabetes mellitus     Hyperlipidemia     Hypertension     Hypothyroidism     Thyroid cancer

## 2022-01-06 NOTE — H&P PST ADULT - AS BP NONINV METHOD
Patient presenting with muscle rigidity s/p starting new psych medication, including haldol. Symptoms likely side effect of medication. Will treat with Benztropine and reassess.
electronic

## 2022-01-06 NOTE — H&P PST ADULT - BREASTS COMMENTS
preop diagnosis unspecified lump in the left breast, mobile left breast mass palpated at approx 2'oclock, NTTP

## 2022-01-06 NOTE — H&P PST ADULT - HISTORY OF PRESENT ILLNESS
This is a 46 year old female with covid 19 infection (6/2021), h/o acquired hypothyroidism, T2DM, HLD, HTN, on medications. thyroid cancer (dx 2015) with recurrent thyroid nodules s/p thyroidectomy 2015 and excision of thyroid nodules x  3 (2016. 2017 and 2018), followed by RT. She is now diagnosed with unspecified lump in the left breast, unspecified quadrant and is scheduled for excision of left breast mass with spot localization 1/14/2022 This is a 46 year old female with covid 19 infection (6/2021), h/o acquired hypothyroidism, T2DM, HLD, HTN, on medications. thyroid cancer (dx 2015) with recurrent thyroid nodules s/p thyroidectomy 2015 and excision of thyroid nodules x  3 (2016. 2017 and 2018), followed by RT. She is now diagnosed with unspecified lump in the left breast, unspecified quadrant (found on SBE) and is scheduled for excision of left breast mass with spot localization 1/14/2022

## 2022-01-06 NOTE — H&P PST ADULT - PROBLEM SELECTOR PLAN 3
Take hypothyroid medication with a sip of water the morning of surgery.  Follow up with PCP post surgery for management of hypothyroidism Take hypothyroid medication with a sip of water the morning of surgery.  Follow up with PCP post surgery for management of hypothyroidism  Addendum on 1/13/2022 by Oriana AVERYC: Pt with acquired hypothyroidism due to total thyroidectomy. Pt is being followed by Endocrinologist Dr Donis Us. Pt is presently on levothyroxine 200 mcg daily. As per endocrinologist's note, Last TSH low, T4 low, Free T4 normal and the thyroglobulin is undetectable with negative anti Tg Ab. Last TSH <0.01. Pt is asymptomatic. See attached visit note with endocrinology from 11/11/2021. Case discussed with anesthesia Dr Houston. Ok to proceed.

## 2022-01-06 NOTE — H&P PST ADULT - PROBLEM SELECTOR PLAN 4
Patient's STOP BANG Score is 1  At low risk for GERALDO  Recommend maintaining GERALDO precautions perioperatively Patient's STOP BANG Score is 1  At low risk for GERALDO  Recommend maintaining GERALDO precautions perioperatively.

## 2022-01-06 NOTE — H&P PST ADULT - PROBLEM SELECTOR PLAN 2
Take antihypertensive medication (s) with a sip of water the morning of surgery.   Follow up with PCP post surgery for management of hypertension

## 2022-01-06 NOTE — H&P PST ADULT - ASSESSMENT
This is a 46 year old female with covid 19 infection (6/2021), h/o acquired hypothyroidism, T2DM, HLD, HTN, on medications. thyroid cancer (dx 2015) with recurrent thyroid nodules s/p thyroidectomy 2015 and excision of thyroid nodules x  3 (2016. 2017 and 2018), followed by RT. She is now diagnosed with unspecified lump in the left breast, unspecified quadrant and is scheduled for excision of left breast mass with spot localization 1/14/2022 This is a 46 year old female with PMH of covid 19 infection (6/2021), acquired hypothyroidism, T2DM, HLD, HTN, compliant on medications diagnosed with unspecified lump in the left breast, unspecified quadrant.  Patient's STOP BANG Score is 1 This is a 46 year old female with PMH of covid 19 infection (6/2021), acquired hypothyroidism, T2DM, HLD, HTN, compliant on medications, leukocytosis as per lab work of 1/7/2022 diagnosed with unspecified lump in the left breast, unspecified quadrant.  Patient's STOP BANG Score is 1

## 2022-01-06 NOTE — H&P PST ADULT - PROBLEM SELECTOR PLAN 5
WBC elevated on 1/7/2021. PCP recommends repeat of CBC preop. Discussed with anesthesiologist Dr Houston. No need to repeat CBC preop. OK to proceed.

## 2022-01-06 NOTE — H&P PST ADULT - ENDOCRINE COMMENTS
h/o thyroid cancer s/p thyroidectomy, now with acquired hypothyroidism h/o thyroid cancer s/p thyroidectomy, now with acquired hypothyroidism-being followed by Endocrinologist

## 2022-01-13 ENCOUNTER — TRANSCRIPTION ENCOUNTER (OUTPATIENT)
Age: 47
End: 2022-01-13

## 2022-01-13 DIAGNOSIS — D72.829 ELEVATED WHITE BLOOD CELL COUNT, UNSPECIFIED: ICD-10-CM

## 2022-01-14 ENCOUNTER — RESULT REVIEW (OUTPATIENT)
Age: 47
End: 2022-01-14

## 2022-01-14 ENCOUNTER — APPOINTMENT (OUTPATIENT)
Dept: SURGERY | Facility: HOSPITAL | Age: 47
End: 2022-01-14
Payer: COMMERCIAL

## 2022-01-14 ENCOUNTER — OUTPATIENT (OUTPATIENT)
Dept: OUTPATIENT SERVICES | Facility: HOSPITAL | Age: 47
LOS: 1 days | End: 2022-01-14
Payer: COMMERCIAL

## 2022-01-14 VITALS
RESPIRATION RATE: 16 BRPM | DIASTOLIC BLOOD PRESSURE: 63 MMHG | OXYGEN SATURATION: 98 % | SYSTOLIC BLOOD PRESSURE: 102 MMHG | TEMPERATURE: 98 F | HEART RATE: 81 BPM

## 2022-01-14 VITALS
OXYGEN SATURATION: 100 % | DIASTOLIC BLOOD PRESSURE: 77 MMHG | HEART RATE: 78 BPM | HEIGHT: 62 IN | TEMPERATURE: 98 F | WEIGHT: 179.9 LBS | SYSTOLIC BLOOD PRESSURE: 109 MMHG | RESPIRATION RATE: 16 BRPM

## 2022-01-14 DIAGNOSIS — N63.20 UNSPECIFIED LUMP IN THE LEFT BREAST, UNSPECIFIED QUADRANT: ICD-10-CM

## 2022-01-14 DIAGNOSIS — Z01.818 ENCOUNTER FOR OTHER PREPROCEDURAL EXAMINATION: ICD-10-CM

## 2022-01-14 DIAGNOSIS — Z98.891 HISTORY OF UTERINE SCAR FROM PREVIOUS SURGERY: Chronic | ICD-10-CM

## 2022-01-14 DIAGNOSIS — Z98.890 OTHER SPECIFIED POSTPROCEDURAL STATES: Chronic | ICD-10-CM

## 2022-01-14 LAB
GLUCOSE BLDC GLUCOMTR-MCNC: 107 MG/DL — HIGH (ref 70–99)
GLUCOSE BLDC GLUCOMTR-MCNC: 87 MG/DL — SIGNIFICANT CHANGE UP (ref 70–99)
HCG UR QL: NEGATIVE — SIGNIFICANT CHANGE UP

## 2022-01-14 PROCEDURE — 88307 TISSUE EXAM BY PATHOLOGIST: CPT | Mod: 26

## 2022-01-14 PROCEDURE — 76098 X-RAY EXAM SURGICAL SPECIMEN: CPT

## 2022-01-14 PROCEDURE — 88307 TISSUE EXAM BY PATHOLOGIST: CPT

## 2022-01-14 PROCEDURE — 19281 PERQ DEVICE BREAST 1ST IMAG: CPT | Mod: LT

## 2022-01-14 PROCEDURE — 19125 EXCISION BREAST LESION: CPT

## 2022-01-14 PROCEDURE — 19281 PERQ DEVICE BREAST 1ST IMAG: CPT

## 2022-01-14 PROCEDURE — 19301 PARTIAL MASTECTOMY: CPT | Mod: LT

## 2022-01-14 PROCEDURE — 82962 GLUCOSE BLOOD TEST: CPT

## 2022-01-14 PROCEDURE — 81025 URINE PREGNANCY TEST: CPT

## 2022-01-14 PROCEDURE — 76098 X-RAY EXAM SURGICAL SPECIMEN: CPT | Mod: 26

## 2022-01-14 RX ORDER — OXYCODONE HYDROCHLORIDE 5 MG/1
1 TABLET ORAL
Qty: 6 | Refills: 0
Start: 2022-01-14

## 2022-01-14 RX ORDER — GLYBURIDE 5 MG
0 TABLET ORAL
Qty: 0 | Refills: 0 | DISCHARGE

## 2022-01-14 RX ORDER — METFORMIN HYDROCHLORIDE 850 MG/1
1 TABLET ORAL
Qty: 0 | Refills: 0 | DISCHARGE

## 2022-01-14 RX ORDER — DAPAGLIFLOZIN 10 MG/1
1 TABLET, FILM COATED ORAL
Qty: 0 | Refills: 0 | DISCHARGE

## 2022-01-14 RX ORDER — LISINOPRIL 2.5 MG/1
1 TABLET ORAL
Qty: 0 | Refills: 0 | DISCHARGE

## 2022-01-14 RX ORDER — FENTANYL CITRATE 50 UG/ML
50 INJECTION INTRAVENOUS
Refills: 0 | Status: DISCONTINUED | OUTPATIENT
Start: 2022-01-14 | End: 2022-01-14

## 2022-01-14 RX ORDER — SIMVASTATIN 20 MG/1
1 TABLET, FILM COATED ORAL
Qty: 0 | Refills: 0 | DISCHARGE

## 2022-01-14 RX ORDER — DULAGLUTIDE 4.5 MG/.5ML
0 INJECTION, SOLUTION SUBCUTANEOUS
Qty: 0 | Refills: 0 | DISCHARGE

## 2022-01-14 RX ORDER — FENTANYL CITRATE 50 UG/ML
25 INJECTION INTRAVENOUS
Refills: 0 | Status: DISCONTINUED | OUTPATIENT
Start: 2022-01-14 | End: 2022-01-14

## 2022-01-14 RX ORDER — ONDANSETRON 8 MG/1
4 TABLET, FILM COATED ORAL ONCE
Refills: 0 | Status: DISCONTINUED | OUTPATIENT
Start: 2022-01-14 | End: 2022-01-14

## 2022-01-14 RX ORDER — LEVOTHYROXINE SODIUM 125 MCG
2 TABLET ORAL
Qty: 0 | Refills: 0 | DISCHARGE

## 2022-01-14 RX ORDER — SODIUM CHLORIDE 9 MG/ML
3 INJECTION INTRAMUSCULAR; INTRAVENOUS; SUBCUTANEOUS EVERY 8 HOURS
Refills: 0 | Status: DISCONTINUED | OUTPATIENT
Start: 2022-01-14 | End: 2022-01-14

## 2022-01-14 RX ADMIN — SODIUM CHLORIDE 3 MILLILITER(S): 9 INJECTION INTRAMUSCULAR; INTRAVENOUS; SUBCUTANEOUS at 07:52

## 2022-01-14 NOTE — BRIEF OPERATIVE NOTE - VENOUS THROMBOEMBOLISM PROPHYLAXIS THERAPY
IPCDs
no loss of consciousness, no gait abnormality, no headache, no sensory deficits, and no weakness.

## 2022-01-14 NOTE — ASU PATIENT PROFILE, ADULT - FALL HARM RISK - UNIVERSAL INTERVENTIONS
Bed in lowest position, wheels locked, appropriate side rails in place/Call bell, personal items and telephone in reach/Instruct patient to call for assistance before getting out of bed or chair/Non-slip footwear when patient is out of bed/[place] to call system/Physically safe environment - no spills, clutter or unnecessary equipment/Purposeful Proactive Rounding/Room/bathroom lighting operational, light cord in reach

## 2022-01-14 NOTE — ASU DISCHARGE PLAN (ADULT/PEDIATRIC) - CARE PROVIDER_API CALL
Jessee Tavarez)  Surgery  [address] [facility] [place] [address]  Phone: [phone]  Fax: [phone]  Follow Up Time: 2 weeks

## 2022-01-14 NOTE — ASU DISCHARGE PLAN (ADULT/PEDIATRIC) - NS MD DC FALL RISK RISK
For information on Fall & Injury Prevention, visit: [email or web address] OR  [email or web address] OR  https://www.cdc.gov/steadi/patient.html

## 2022-01-19 LAB — SURGICAL PATHOLOGY STUDY: SIGNIFICANT CHANGE UP

## 2022-01-20 ENCOUNTER — NON-APPOINTMENT (OUTPATIENT)
Age: 47
End: 2022-01-20

## 2022-01-26 PROBLEM — U07.1 COVID-19: Chronic | Status: ACTIVE | Noted: 2022-01-06

## 2022-01-27 ENCOUNTER — APPOINTMENT (OUTPATIENT)
Dept: SURGERY | Facility: CLINIC | Age: 47
End: 2022-01-27
Payer: COMMERCIAL

## 2022-01-27 VITALS — TEMPERATURE: 97.3 F

## 2022-01-27 DIAGNOSIS — D36.9 BENIGN NEOPLASM, UNSPECIFIED SITE: ICD-10-CM

## 2022-01-27 PROCEDURE — 99024 POSTOP FOLLOW-UP VISIT: CPT

## 2022-01-27 NOTE — PHYSICAL EXAM
[Calm] : calm [de-identified] : She  is alert, well-groomed, and in NAD\par   [de-identified] : left breast Surgical wound is healing well.   no signs of  inflammation or infection.

## 2022-01-27 NOTE — HISTORY OF PRESENT ILLNESS
[de-identified] : Ms. VELEZ  is s/p excision left breast mass with spot localization on 01/14/2022. 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 Today  Ms. VELEZ offers no complaints. patient reports no fever or  chills. patient reports occasional discomfort in the surgical area.   Her surgical incisions are healing well. No signs of inflammation, infection or exudate. patient reports good bowel movements and appetite. \par PERTINENT HISTORY: \par Ms. REMI GARY is s/p BL breast lumpectomy 24 years ago. 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 AGUSTIN is s/p sono-guided biopsy left breast on 12/07/2021. Patient's pathology results were consistent with Intraductal papilloma with florid proliferative change and sclerosis.

## 2022-01-27 NOTE — DATA REVIEWED
[FreeTextEntry1] : Patient:   RAMÓN VELEZ\par \par \par Accession:                             [id number]\par \par Collected Date/Time:                   1/14/2022 10:58 EST\par Received Date/Time:                    1/14/2022 11:16 EST\par \par Surgical Pathology Report - Auth (Verified)\par \par Specimen(s) Submitted\par 1  Left breast mass\par \par Final Diagnosis\par \par Mass, left breast; excision with needle guided localization:\par - Sclerosing intraductal papilloma with florid ductal epithelial\par hyperplasia.\par \par -  Fibrocystic change harboring microcalcifications and including usual\par and papillary ductal epithelial hyperplasia, apocrine metaplasia,\par sclerosing and blunt duct adenosis, nodular adenosis, stromal fibrosis\par and cysts.\par - Tissue changes consistent with previous biopsy site.\par Verified by: Chrissy Edwards MD\par (Electronic Signature)\par Reported on: 01/19/22 15:08 EST, [address]\par Phone: [phone]   Fax: [phone]\par _________________________________________________________________\par \par \par Clinical Information\par n/a

## 2022-02-15 ENCOUNTER — NON-APPOINTMENT (OUTPATIENT)
Age: 47
End: 2022-02-15

## 2022-02-24 ENCOUNTER — APPOINTMENT (OUTPATIENT)
Dept: ENDOCRINOLOGY | Facility: CLINIC | Age: 47
End: 2022-02-24
Payer: COMMERCIAL

## 2022-02-24 VITALS
HEIGHT: 62 IN | HEART RATE: 99 BPM | WEIGHT: 185 LBS | OXYGEN SATURATION: 98 % | DIASTOLIC BLOOD PRESSURE: 102 MMHG | RESPIRATION RATE: 16 BRPM | TEMPERATURE: 98.1 F | SYSTOLIC BLOOD PRESSURE: 150 MMHG | BODY MASS INDEX: 34.04 KG/M2

## 2022-02-24 LAB — GLUCOSE BLDC GLUCOMTR-MCNC: 69

## 2022-02-24 PROCEDURE — 99214 OFFICE O/P EST MOD 30 MIN: CPT | Mod: 25

## 2022-02-24 PROCEDURE — 82962 GLUCOSE BLOOD TEST: CPT | Mod: NC

## 2022-02-24 NOTE — ASSESSMENT
[FreeTextEntry1] : The patient is clinically slightly hyperthyroid\par The thyroid cancer is stable\par The TSH is well suppressed and the Thyroglobulin is undetectable with negative Antibodies\par Will repeat the thyroid function tests\par Will repeat the US thyroid\par Will continue the same treatment\par The diabetes is well controlled\par She is unable to lose weight\par Will raise the Trulicity dose\par The Atorvastatin was renewed\par

## 2022-02-24 NOTE — DATA REVIEWED
[FreeTextEntry1] : The FBS was elevated but the HbA1c was better. The renal function in the blood was fine but the microalbumin in the urine was elevated, Her TSH was suppressed with elevated Free T4 and low Tg. The LDL-C was elevated. She has not been taking the Atorvastatin

## 2022-02-24 NOTE — PHYSICAL EXAM
[Alert] : alert [No Acute Distress] : no acute distress [No Neck Mass] : no neck mass was observed [No Respiratory Distress] : no respiratory distress [Clear to Auscultation] : lungs were clear to auscultation bilaterally [Normal PMI] : the apical impulse was normal [Normal Rate] : heart rate was normal [de-identified] : Surgical scat present

## 2022-02-24 NOTE — HISTORY OF PRESENT ILLNESS
[FreeTextEntry1] : Patient feels well , she is asymptomatic. Her weight has not changed. She is exercising regularly and following the diet. Her blood glucose at home are well controlled. The FBS in the laboratory was 225 mg/dl and the HbA1c was 6.3 %, mproved since last visit. The renal function is within normal limits, the microalbumin in the urine was abnormal. The lipid panel was within normal limits. She denies low blood glucose during the night. She denies chest pain, or SOB. Denies numbness, tingling or burning sensation on her extremities. Taking her medications regularly. She has seen the Ophthalmologist recently. She has not seen Podiatrist recently. She has not seen the Cardiologist recently.\par

## 2022-04-28 ENCOUNTER — APPOINTMENT (OUTPATIENT)
Dept: ENDOCRINOLOGY | Facility: CLINIC | Age: 47
End: 2022-04-28
Payer: COMMERCIAL

## 2022-04-28 ENCOUNTER — APPOINTMENT (OUTPATIENT)
Dept: SURGERY | Facility: CLINIC | Age: 47
End: 2022-04-28

## 2022-04-28 VITALS
SYSTOLIC BLOOD PRESSURE: 113 MMHG | BODY MASS INDEX: 34.6 KG/M2 | WEIGHT: 188 LBS | HEART RATE: 101 BPM | OXYGEN SATURATION: 97 % | RESPIRATION RATE: 16 BRPM | TEMPERATURE: 98.3 F | DIASTOLIC BLOOD PRESSURE: 80 MMHG | HEIGHT: 62 IN

## 2022-04-28 LAB — GLUCOSE BLDC GLUCOMTR-MCNC: 162

## 2022-04-28 PROCEDURE — 82962 GLUCOSE BLOOD TEST: CPT | Mod: NC

## 2022-04-28 PROCEDURE — 99214 OFFICE O/P EST MOD 30 MIN: CPT | Mod: 25

## 2022-04-28 NOTE — ASSESSMENT
[FreeTextEntry1] : The hypothyroidism is not well controlled\par She has been mixing the thyroid medication with the other pills\par Advised to take the thyroid medication with water alone 1/2 hour before breakfast*\par Will add Levothyroxine 50 mcg po 3 times per week to the 175 mcg taken daily\par When she used to take it every day the TSH was low\par Will repeat the blood tests in 4 weeks\par She has been scheduled for bariatric surgery

## 2022-04-28 NOTE — PHYSICAL EXAM
[Alert] : alert [No Acute Distress] : no acute distress [No Neck Mass] : no neck mass was observed [No Respiratory Distress] : no respiratory distress [Clear to Auscultation] : lungs were clear to auscultation bilaterally [Normal PMI] : the apical impulse was normal [Normal Rate] : heart rate was normal [de-identified] : Surgical scat present

## 2022-04-28 NOTE — HISTORY OF PRESENT ILLNESS
[FreeTextEntry1] : Patient feels well , she is asymptomatic. Her weight has increased. The FBS in the laboratory was 176 mg/dl and the HbA1c was 7.4, since the last visit she has gained weight. The renal function is within normal limits, the microalbumin in the urine was normal. She denies low blood glucose during the night. She denies chest pain, or SOB. Denies numbness, tingling or burning sensation on her extremities. The TSH and free t4 are abnormal. She has been mixing the Levothyroxine with the other medications.

## 2022-04-28 NOTE — DATA REVIEWED
[FreeTextEntry1] : The FBS and hbA1c have increased. Her Free t4 is low with elevated TSH. The Tg remains low.

## 2022-05-26 ENCOUNTER — APPOINTMENT (OUTPATIENT)
Dept: ENDOCRINOLOGY | Facility: CLINIC | Age: 47
End: 2022-05-26
Payer: COMMERCIAL

## 2022-05-26 DIAGNOSIS — E89.0 POSTPROCEDURAL HYPOTHYROIDISM: ICD-10-CM

## 2022-05-26 LAB — GLUCOSE BLDC GLUCOMTR-MCNC: 162

## 2022-05-26 PROCEDURE — 82962 GLUCOSE BLOOD TEST: CPT | Mod: NC

## 2022-05-26 PROCEDURE — 99214 OFFICE O/P EST MOD 30 MIN: CPT | Mod: 25

## 2022-05-26 RX ORDER — LEVOTHYROXINE SODIUM 0.05 MG/1
50 TABLET ORAL
Qty: 90 | Refills: 3 | Status: COMPLETED | COMMUNITY
Start: 2020-07-13 | End: 2022-05-26

## 2022-05-26 RX ORDER — LEVOTHYROXINE SODIUM 0.05 MG/1
50 TABLET ORAL
Qty: 90 | Refills: 3 | Status: COMPLETED | COMMUNITY
Start: 2022-04-28 | End: 2022-05-26

## 2022-05-26 NOTE — ASSESSMENT
[FreeTextEntry1] : The diabetic control has improved\par She is now taking the Levothyroxine properly*\par The TSH is low with elevated Free T4 and low Tg\par Will reduce the dose of levothyroxine slightly\par She has been taking Levothyroxine 175 mcg po QD plus 50 mcg po twice per week\par The new dose is Levothyroxine 175 mcg po QD plus 25 mcg po twice per week\par Will repeat the blood tests in 4 weeks\par Advised to see the Head and Neck surgeon Dr. Toscano\par She is being prepared for Bariatric surgery

## 2022-05-26 NOTE — HISTORY OF PRESENT ILLNESS
[FreeTextEntry1] : The patient had a total/partial thyroidectomy for thyroid cancer. She is doing well. Denies any trouble swallowing, neck pain, heat intolerance, palpitations, hoarseness, fatigue. Her weight is unchanged/decreased/increased. Her TSH is not/well suppressed, the Free T4 is normal and the thyroglobulin is undetectable/detectable with negative Anti Tg Ab. Her calcium level is normal. She has had Metastatic surveys and  Ablation treatment. The US thyroid was unchanged. She is taking the Levothyroxine regularly and the Calcium supplements.\par \par

## 2022-05-26 NOTE — PHYSICAL EXAM
[Alert] : alert [No Acute Distress] : no acute distress [No Neck Mass] : no neck mass was observed [No Respiratory Distress] : no respiratory distress [Clear to Auscultation] : lungs were clear to auscultation bilaterally [Normal PMI] : the apical impulse was normal [Normal Rate] : heart rate was normal [de-identified] : Surgical scar present

## 2022-05-26 NOTE — DATA REVIEWED
[FreeTextEntry1] : The TSH is now low with slightly elevated free t4. The Tg is low. The FBS and hbA1c have improved. The US thyroid was unremarkable.

## 2022-06-30 ENCOUNTER — APPOINTMENT (OUTPATIENT)
Dept: ENDOCRINOLOGY | Facility: CLINIC | Age: 47
End: 2022-06-30

## 2022-06-30 VITALS
OXYGEN SATURATION: 97 % | TEMPERATURE: 98.2 F | WEIGHT: 188 LBS | HEIGHT: 62 IN | RESPIRATION RATE: 16 BRPM | SYSTOLIC BLOOD PRESSURE: 113 MMHG | HEART RATE: 84 BPM | DIASTOLIC BLOOD PRESSURE: 76 MMHG | BODY MASS INDEX: 34.6 KG/M2

## 2022-06-30 LAB — GLUCOSE BLDC GLUCOMTR-MCNC: 150

## 2022-06-30 PROCEDURE — 82962 GLUCOSE BLOOD TEST: CPT | Mod: NC

## 2022-06-30 PROCEDURE — 99214 OFFICE O/P EST MOD 30 MIN: CPT

## 2022-06-30 NOTE — PHYSICAL EXAM
[Alert] : alert [No Acute Distress] : no acute distress [No Neck Mass] : no neck mass was observed [No Respiratory Distress] : no respiratory distress [Clear to Auscultation] : lungs were clear to auscultation bilaterally [Normal PMI] : the apical impulse was normal [Normal Rate] : heart rate was normal [de-identified] : Surgical scar present

## 2022-06-30 NOTE — DATA REVIEWED
[FreeTextEntry1] : Her Free t4 is now low normal and the TSH is elevated. Her thyroglobulin remains low. The HbA1c has deteriorated slightly and the FBS has improved

## 2022-06-30 NOTE — ASSESSMENT
[FreeTextEntry1] : Patient is now hypothyroid after a minor change in the levothyroxine dose\par When she was on an average of 189 mg po daily her TSH was low with elevated Free T4 and with the average Levothyroxine of 182 mg her TSH is high and the free t4 is low normal\par The patient was advised to take Levothyroxine 200 mcg po daily and repeat the thyroid tests next Tuesday\par She will call me back on Wednesday for results\par The diabetic control has deteriorated slightly but is stable for the surgical procedure\par She is awaiting clearance for the bariatric surgery

## 2022-06-30 NOTE — HISTORY OF PRESENT ILLNESS
[FreeTextEntry1] : The patient had a total thyroidectomy for thyroid cancer. She is doing well. Denies any trouble swallowing, neck pain, heat intolerance, palpitations, hoarseness, fatigue. Her weight is unchanged. Her TSH is now elevated, with low normal free T4 and the thyroglobulin is undetectable with negative Anti Tg Ab. Her calcium level is normal. It is not clear if she has been taking the thyroid medication properly. She initially indicated that she was taking 175 mcg po 5 times per week and 50 mcg po weekends, then she corrected and she said that she was also taking on weekends the 175 mcg plus the 50 mcg.\par \par

## 2022-07-04 RX ORDER — LEVOTHYROXINE SODIUM 0.17 MG/1
175 TABLET ORAL DAILY
Qty: 90 | Refills: 3 | Status: COMPLETED | COMMUNITY
Start: 2021-01-04 | End: 2022-07-04

## 2022-07-04 RX ORDER — LEVOTHYROXINE SODIUM 0.03 MG/1
25 TABLET ORAL
Qty: 30 | Refills: 3 | Status: COMPLETED | COMMUNITY
Start: 2022-05-26 | End: 2022-07-04

## 2022-07-06 ENCOUNTER — NON-APPOINTMENT (OUTPATIENT)
Age: 47
End: 2022-07-06

## 2022-07-13 ENCOUNTER — NON-APPOINTMENT (OUTPATIENT)
Age: 47
End: 2022-07-13

## 2022-08-04 ENCOUNTER — APPOINTMENT (OUTPATIENT)
Dept: ENDOCRINOLOGY | Facility: CLINIC | Age: 47
End: 2022-08-04

## 2022-10-27 ENCOUNTER — APPOINTMENT (OUTPATIENT)
Dept: ENDOCRINOLOGY | Facility: CLINIC | Age: 47
End: 2022-10-27

## 2022-10-27 VITALS
SYSTOLIC BLOOD PRESSURE: 134 MMHG | WEIGHT: 158 LBS | OXYGEN SATURATION: 98 % | TEMPERATURE: 98.3 F | HEART RATE: 75 BPM | DIASTOLIC BLOOD PRESSURE: 86 MMHG | HEIGHT: 62 IN | RESPIRATION RATE: 16 BRPM | BODY MASS INDEX: 29.08 KG/M2

## 2022-10-27 LAB
GLUCOSE BLDC GLUCOMTR-MCNC: 112
HBA1C MFR BLD HPLC: 6.5

## 2022-10-27 PROCEDURE — 82962 GLUCOSE BLOOD TEST: CPT | Mod: NC

## 2022-10-27 PROCEDURE — 99214 OFFICE O/P EST MOD 30 MIN: CPT | Mod: 25

## 2022-10-27 PROCEDURE — 83036 HEMOGLOBIN GLYCOSYLATED A1C: CPT | Mod: QW

## 2022-10-27 RX ORDER — GLIPIZIDE 2.5 MG/1
2.5 TABLET, FILM COATED, EXTENDED RELEASE ORAL
Qty: 90 | Refills: 3 | Status: COMPLETED | COMMUNITY
Start: 2019-06-24 | End: 2022-10-27

## 2022-10-27 RX ORDER — DULAGLUTIDE 1.5 MG/.5ML
1.5 INJECTION, SOLUTION SUBCUTANEOUS
Qty: 12 | Refills: 3 | Status: COMPLETED | COMMUNITY
Start: 2021-04-28 | End: 2022-10-27

## 2022-10-27 NOTE — HISTORY OF PRESENT ILLNESS
[FreeTextEntry1] : Patient is s/p Bariatric surgery. Patient feels well , she is asymptomatic. She has lost weight. Her weight has decreased. She is exercising regularly and following the diet. Her blood glucose at home are well controlled, they are usually around 130 to 140 mg/dl. She denies low blood glucose during the night. She denies chest pain, or SOB. Denies numbness, tingling or burning sensation on her extremities. Taking her medications regularly. She has seen the Ophthalmologist recently. She has not seen Podiatrist recently. She has not seen the Cardiologist recently. Her PPS is fine, the HbA1c was 6.5%.\par

## 2022-10-27 NOTE — ASSESSMENT
[FreeTextEntry1] : The diabetes has improved is now well controlled\par She has lost weight\par She is walking regularly and following the diet\par No recent thyroid tests\par She is not using the Trulicity\par Will discontinue the Glipizide\par Advised to take only 1/2 tablet of the Levothyroxine until Monday that we get the thyroid tests results\par Patient will call me Monday

## 2022-10-27 NOTE — PHYSICAL EXAM
[Alert] : alert [Well Nourished] : well nourished [No Acute Distress] : no acute distress [Well Developed] : well developed [Normal Sclera/Conjunctiva] : normal sclera/conjunctiva [EOMI] : extra ocular movement intact [No Proptosis] : no proptosis [Normal Oropharynx] : the oropharynx was normal [No Neck Mass] : no neck mass was observed [No Thyroid Nodules] : no palpable thyroid nodules [No Respiratory Distress] : no respiratory distress [No Accessory Muscle Use] : no accessory muscle use [Clear to Auscultation] : lungs were clear to auscultation bilaterally [Normal S1, S2] : normal S1 and S2 [Normal Rate] : heart rate was normal [Regular Rhythm] : with a regular rhythm [No Edema] : no peripheral edema [Pedal Pulses Normal] : the pedal pulses are present [Normal Bowel Sounds] : normal bowel sounds [Not Tender] : non-tender [Not Distended] : not distended [Soft] : abdomen soft [Normal Anterior Cervical Nodes] : no anterior cervical lymphadenopathy [Normal Posterior Cervical Nodes] : no posterior cervical lymphadenopathy [No Spinal Tenderness] : no spinal tenderness [Spine Straight] : spine straight [No Stigmata of Cushings Syndrome] : no stigmata of Cushings Syndrome [Normal Gait] : normal gait [Normal Strength/Tone] : muscle strength and tone were normal [No Rash] : no rash [Normal Reflexes] : deep tendon reflexes were 2+ and symmetric [No Tremors] : no tremors [Oriented x3] : oriented to person, place, and time [Acanthosis Nigricans] : no acanthosis nigricans [de-identified] : Surgical scar present

## 2022-12-01 ENCOUNTER — APPOINTMENT (OUTPATIENT)
Dept: SURGERY | Facility: CLINIC | Age: 47
End: 2022-12-01

## 2022-12-01 VITALS
DIASTOLIC BLOOD PRESSURE: 90 MMHG | WEIGHT: 149 LBS | BODY MASS INDEX: 27.42 KG/M2 | HEIGHT: 62 IN | SYSTOLIC BLOOD PRESSURE: 134 MMHG | HEART RATE: 72 BPM

## 2022-12-01 DIAGNOSIS — E11.9 TYPE 2 DIABETES MELLITUS W/OUT COMPLICATIONS: ICD-10-CM

## 2022-12-01 PROCEDURE — 99213 OFFICE O/P EST LOW 20 MIN: CPT

## 2022-12-01 RX ORDER — ATORVASTATIN CALCIUM 20 MG/1
20 TABLET, FILM COATED ORAL
Qty: 90 | Refills: 3 | Status: COMPLETED | COMMUNITY
Start: 2019-06-24 | End: 2022-12-01

## 2022-12-01 RX ORDER — LISINOPRIL 10 MG/1
10 TABLET ORAL
Qty: 90 | Refills: 3 | Status: COMPLETED | COMMUNITY
End: 2022-12-01

## 2022-12-01 NOTE — HISTORY OF PRESENT ILLNESS
[de-identified] : Ms. VELEZ  is s/p excision left breast mass with spot localization on 01/14/2022. 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   She is presenting today with a CC ofhaving right breast mass that she noticed after losing 40 lbs.    Ms. VELEZ denies any trauma and she has no nipple discharge. Patient denies any fever, night sweats or loss of appetite. Patient is on no hormonal replacement therapy. \par PERTINENT HISTORY: \par Ms. REMI GARY is s/p BL breast lumpectomy 24 years ago. 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 AGUSTIN is s/p sono-guided biopsy left breast on 12/07/2021. Patient's pathology results were consistent with Intraductal papilloma with florid proliferative change and sclerosis.  [de-identified] : Ms. VELZE  is s/p bariatric surgery with 40 lbs weight lose \par

## 2022-12-01 NOTE — PHYSICAL EXAM
[Alert] : alert [Oriented to Person] : oriented to person [Oriented to Place] : oriented to place [Oriented to Time] : oriented to time [Calm] : calm [de-identified] : She  is alert, well-groomed, and in NAD\par   [de-identified] : left breast  well healed scar No chest deformity. Breast are symmetric, Normal contours. No nodules, masses, tenderness, or axillary or supraclavicular  adenopathy. No nipple discharge. no skin retraction

## 2022-12-01 NOTE — PLAN
[FreeTextEntry1] : Ms. VELEZ  is presenting  today for an evaluation .  she is doing well and offers no complaints.  Results of  her last imaging and physical examination findings were discussed in details.   She  was advised to have BL       breast US and Mammogram and return after the tests. Importance of monthly self-breast examination was reinforced.  Patient's questions and concerns addressed to patient's satisfaction.\par \par

## 2022-12-22 ENCOUNTER — NON-APPOINTMENT (OUTPATIENT)
Age: 47
End: 2022-12-22

## 2023-01-26 ENCOUNTER — APPOINTMENT (OUTPATIENT)
Dept: SURGERY | Facility: CLINIC | Age: 48
End: 2023-01-26
Payer: COMMERCIAL

## 2023-01-26 ENCOUNTER — APPOINTMENT (OUTPATIENT)
Dept: ENDOCRINOLOGY | Facility: CLINIC | Age: 48
End: 2023-01-26

## 2023-02-09 ENCOUNTER — APPOINTMENT (OUTPATIENT)
Dept: SURGERY | Facility: CLINIC | Age: 48
End: 2023-02-09
Payer: COMMERCIAL

## 2023-02-09 VITALS
HEIGHT: 62 IN | DIASTOLIC BLOOD PRESSURE: 83 MMHG | WEIGHT: 141 LBS | HEART RATE: 80 BPM | SYSTOLIC BLOOD PRESSURE: 127 MMHG | BODY MASS INDEX: 25.95 KG/M2

## 2023-02-09 PROCEDURE — 19000 PUNCTURE ASPIR CYST BREAST: CPT

## 2023-02-09 PROCEDURE — 99213 OFFICE O/P EST LOW 20 MIN: CPT | Mod: 25

## 2023-02-09 NOTE — ASSESSMENT
[FreeTextEntry1] : Impression: right breast palpable mass- cyst \par otherwise benign breast exam and Imaging

## 2023-02-09 NOTE — PHYSICAL EXAM
[Alert] : alert [Oriented to Person] : oriented to person [Oriented to Place] : oriented to place [Oriented to Time] : oriented to time [Calm] : calm [de-identified] : She  is alert, well-groomed, and in NAD\par   [de-identified] : left breast  well healed scar No chest deformity. Breast are symmetric, Normal contours.  right breast palpable mass, 1000 , mobile smooth,  Left breast without  nodules, masses, tenderness, or axillary or supraclavicular  adenopathy. No nipple discharge. no skin retraction

## 2023-02-09 NOTE — PLAN
[FreeTextEntry1] : Ms. VELEZ  was told significance of findings, options, risks and benefits were explained.  She has a right breast cyst that is palpable and is seen on US.  she is reporting pain in the same locations.  All surgical options were discussed including non-surgical treatments.  she was advised to have FNS right breast cyst. 1 ml greenish colored fluid was aspirated. patient will follow up in 6  months for a breast exam  or if needed.  Importance of monthly self-breast examination was reinforced. \par Patient advised to seek immediate medical attention with any acute change in symptoms or with the development of any new or worsening symptoms.  Patient's questions and concerns addressed to patient's satisfaction, and patient verbalized an understanding of the information discussed.\par \par

## 2023-02-09 NOTE — HISTORY OF PRESENT ILLNESS
[de-identified] : Ms. VELEZ  is s/p excision left breast mass with spot localization on 01/14/2022. 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  Ms. VELEZ  is  s/p  BL mammo and US on 02/03/2023. It was deemed BIRADS 2. Ms. VELEZ denies any trauma and she has no nipple discharge. Patient denies any fever, night sweats or loss of appetite. Patient is on no hormonal replacement therapy.  LMP in January. she is reporting pain in her right breast. \par \par PERTINENT HISTORY: \par Ms. REMI GARY is s/p BL breast lumpectomy 24 years ago. 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 AGUSTIN is s/p sono-guided biopsy left breast on 12/07/2021. Patient's pathology results were consistent with Intraductal papilloma with florid proliferative change and sclerosis.  [de-identified] : Patient reports no interval changes to her overall health status or medical history

## 2023-02-27 ENCOUNTER — APPOINTMENT (OUTPATIENT)
Dept: SURGERY | Facility: CLINIC | Age: 48
End: 2023-02-27
Payer: COMMERCIAL

## 2023-02-27 VITALS
WEIGHT: 140 LBS | HEIGHT: 62 IN | SYSTOLIC BLOOD PRESSURE: 109 MMHG | HEART RATE: 82 BPM | DIASTOLIC BLOOD PRESSURE: 72 MMHG | BODY MASS INDEX: 25.76 KG/M2

## 2023-02-27 PROCEDURE — 99213 OFFICE O/P EST LOW 20 MIN: CPT

## 2023-02-27 NOTE — HISTORY OF PRESENT ILLNESS
[de-identified] : Ms. VELEZ is s/p excision left breast mass with spot localization on 01/14/2022. Patient's pathology results were consistent with Sclerosing intraductal papilloma with florid ductal epithelial hyperplasia. Ms. VELEZ is s/p BL mammo and US on 02/03/2023. It was deemed BIRADS 2.  Ms. VELEZ  is s/p FNA right breast cyst on 02/09/2023.  she is here today because she feels that the right breast lump came back few days ago.  Ms. VELEZ denies any trauma and she has no nipple discharge. Patient denies any fever, night sweats or loss of appetite. she has her period now. \par \par PERTINENT HISTORY: \par Ms. REMI GARY is s/p BL breast lumpectomy 24 years ago. 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 AGUSTIN is s/p sono-guided biopsy left breast on 12/07/2021. Patient's pathology results were consistent with Intraductal papilloma with florid proliferative change and sclerosis. \par  [de-identified] :  Patient reports no interval changes to her overall health status or medical history

## 2023-02-27 NOTE — PHYSICAL EXAM
[Alert] : alert [Oriented to Person] : oriented to person [Oriented to Place] : oriented to place [Oriented to Time] : oriented to time [Calm] : calm [de-identified] : She  is alert, well-groomed, and in NAD\par   [de-identified] : left breast  well healed scar No chest deformity. Breast are symmetric, Normal contours.  right breast palpable mass, 1000 , mobile smooth,  Left breast without  nodules, masses, tenderness, or axillary or supraclavicular  adenopathy. No nipple discharge. no skin retraction

## 2023-02-27 NOTE — PLAN
[FreeTextEntry1] : Ms. VELEZ was told significance of findings, options, risks and benefits were explained. She has a right breast   palpable mass . she is reporting pain in the same locations. All surgical options were discussed including non-surgical treatments. she was offered to have FNS right breast cyst.  attempt to aspirate the cyst x2 was unsuccessful  patient will follow up in  2 weeks  for a breast exam or if needed. Importance of monthly self-breast examination was reinforced. \par Patient advised to seek immediate medical attention with any acute change in symptoms or with the development of any new or worsening symptoms. Patient's questions and concerns addressed to patient's satisfaction, and patient verbalized an understanding of the information discussed.\par \par

## 2023-03-06 ENCOUNTER — APPOINTMENT (OUTPATIENT)
Dept: SURGERY | Facility: CLINIC | Age: 48
End: 2023-03-06
Payer: COMMERCIAL

## 2023-03-06 VITALS
DIASTOLIC BLOOD PRESSURE: 88 MMHG | HEART RATE: 96 BPM | BODY MASS INDEX: 25.4 KG/M2 | WEIGHT: 138 LBS | HEIGHT: 62 IN | SYSTOLIC BLOOD PRESSURE: 128 MMHG

## 2023-03-06 PROCEDURE — 99213 OFFICE O/P EST LOW 20 MIN: CPT

## 2023-03-09 NOTE — HISTORY OF PRESENT ILLNESS
[de-identified] : This is  a 48 year   old patient presenting today for a follow up breast exam .  Ms. VELEZ  was  seen for right breast mass on 02/27/2023. attempt to aspirate the cyst x2 was unsuccessful. Ms. VELEZ denies any trauma and she has no nipple discharge. Patient denies any fever, night sweats or loss of appetite.   she reports resolution of the mass \par \par \par PERTINENT HISTORY: \par Ms. REMI GARY is s/p BL breast lumpectomy 24 years ago. 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 AGUSTIN is s/p sono-guided biopsy left breast on 12/07/2021. Patient's pathology results were consistent with Intraductal papilloma with florid proliferative change and sclerosis. Ms. VELEZ is s/p excision left breast mass with spot localization on 01/14/2022. Patient's pathology results were consistent with Sclerosing intraductal papilloma with florid ductal epithelial hyperplasia. Ms. VELEZ is s/p BL mammo and US on 02/03/2023. It was deemed BIRADS 2. Ms. VELEZ is s/p FNA right breast cyst on 02/09/2023. [de-identified] : Patient reports no interval changes to her overall health status or medical history

## 2023-03-09 NOTE — PHYSICAL EXAM
[Alert] : alert [Oriented to Person] : oriented to person [Oriented to Place] : oriented to place [Oriented to Time] : oriented to time [Calm] : calm [de-identified] : She  is alert, well-groomed, and cheerful.\par   [de-identified] : right breast well healed scar. No chest deformity. Breast are symmetric, Normal contours. No nodules, masses, tenderness, or axillary or supraclavicular  adenopathy. No nipple discharge or bleeding. no nipple or skin retraction

## 2023-03-20 RX ORDER — LEVOTHYROXINE SODIUM 0.17 MG/1
175 TABLET ORAL
Qty: 30 | Refills: 3 | Status: COMPLETED | COMMUNITY
Start: 2022-06-30 | End: 2023-03-20

## 2023-04-13 ENCOUNTER — APPOINTMENT (OUTPATIENT)
Dept: SURGERY | Facility: CLINIC | Age: 48
End: 2023-04-13
Payer: COMMERCIAL

## 2023-04-13 VITALS
SYSTOLIC BLOOD PRESSURE: 123 MMHG | HEART RATE: 71 BPM | WEIGHT: 135 LBS | BODY MASS INDEX: 24.84 KG/M2 | DIASTOLIC BLOOD PRESSURE: 79 MMHG | HEIGHT: 62 IN

## 2023-04-13 DIAGNOSIS — N63.10 UNSPECIFIED LUMP IN THE RIGHT BREAST, UNSPECIFIED QUADRANT: ICD-10-CM

## 2023-04-13 PROCEDURE — 99212 OFFICE O/P EST SF 10 MIN: CPT

## 2023-04-27 PROBLEM — N63.10 BREAST MASS, RIGHT: Status: ACTIVE | Noted: 2022-12-01

## 2023-04-27 NOTE — PLAN
[FreeTextEntry1] : Ms. VELEZ  is presenting  today for an evaluation .  she is doing well and offers no complaints.  Results of  he physical examination findings were discussed in details.  She will follow up in 6 months or if needed. Importance of monthly self-breast examination was reinforced.  Patient's questions and concerns addressed to patient's satisfaction.\par \par

## 2023-04-27 NOTE — PHYSICAL EXAM
[Alert] : alert [Oriented to Person] : oriented to person [Oriented to Place] : oriented to place [Oriented to Time] : oriented to time [Calm] : calm [de-identified] : She  is alert, well-groomed, and in NAD\par   [de-identified] : left breast  well healed scar. No chest deformity. Breast are symmetric, Normal contours. No nodules, masses, tenderness, or axillary or supraclavicular  adenopathy. No nipple discharge. no skin retraction \par

## 2023-04-27 NOTE — HISTORY OF PRESENT ILLNESS
[de-identified] :  This is a 48 year old patient presenting today for a follow up breast exam. Ms. VELEZ was seen for right breast mass on 02/27/2023. attempt to aspirate the cyst x2 was unsuccessful.    she reports resolution of the mass. sal VELEZ denies any trauma and she has no nipple discharge. Patient denies any fever, night sweats or loss of appetite. \par \par \par PERTINENT HISTORY: \par Ms. REMI GARY is s/p BL breast lumpectomy 24 years ago. 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 AGUSTIN is s/p sono-guided biopsy left breast on 12/07/2021. Patient's pathology results were consistent with Intraductal papilloma with florid proliferative change and sclerosis. \par

## 2023-05-19 ENCOUNTER — NON-APPOINTMENT (OUTPATIENT)
Age: 48
End: 2023-05-19

## 2023-06-12 NOTE — ASSESSMENT
[FreeTextEntry1] : Impression: right breast palpable mass \par otherwise benign breast exam and Imaging 
No

## 2023-08-10 ENCOUNTER — APPOINTMENT (OUTPATIENT)
Dept: SURGERY | Facility: CLINIC | Age: 48
End: 2023-08-10

## 2023-08-11 ENCOUNTER — APPOINTMENT (OUTPATIENT)
Dept: ENDOCRINOLOGY | Facility: CLINIC | Age: 48
End: 2023-08-11

## 2023-09-20 LAB — SARS-COV-2 N GENE NPH QL NAA+PROBE: NOT DETECTED

## 2023-10-30 ENCOUNTER — NON-APPOINTMENT (OUTPATIENT)
Age: 48
End: 2023-10-30

## 2023-11-30 ENCOUNTER — APPOINTMENT (OUTPATIENT)
Dept: SURGERY | Facility: CLINIC | Age: 48
End: 2023-11-30

## 2023-12-14 ENCOUNTER — APPOINTMENT (OUTPATIENT)
Dept: ENDOCRINOLOGY | Facility: CLINIC | Age: 48
End: 2023-12-14
Payer: COMMERCIAL

## 2023-12-14 VITALS
DIASTOLIC BLOOD PRESSURE: 74 MMHG | OXYGEN SATURATION: 96 % | HEART RATE: 74 BPM | WEIGHT: 140 LBS | TEMPERATURE: 97 F | RESPIRATION RATE: 16 BRPM | BODY MASS INDEX: 25.76 KG/M2 | HEIGHT: 62 IN | SYSTOLIC BLOOD PRESSURE: 113 MMHG

## 2023-12-14 DIAGNOSIS — E03.9 HYPOTHYROIDISM, UNSPECIFIED: ICD-10-CM

## 2023-12-14 DIAGNOSIS — E11.9 TYPE 2 DIABETES MELLITUS W/OUT COMPLICATIONS: ICD-10-CM

## 2023-12-14 DIAGNOSIS — C73 MALIGNANT NEOPLASM OF THYROID GLAND: ICD-10-CM

## 2023-12-14 PROCEDURE — 82962 GLUCOSE BLOOD TEST: CPT

## 2023-12-14 PROCEDURE — 99214 OFFICE O/P EST MOD 30 MIN: CPT | Mod: 25

## 2023-12-14 RX ORDER — DAPAGLIFLOZIN 10 MG/1
10 TABLET, FILM COATED ORAL DAILY
Qty: 90 | Refills: 3 | Status: COMPLETED | COMMUNITY
Start: 2019-05-21 | End: 2023-12-14

## 2023-12-14 RX ORDER — LEVOTHYROXINE SODIUM 0.15 MG/1
150 TABLET ORAL
Qty: 30 | Refills: 3 | Status: ACTIVE | COMMUNITY
Start: 2023-01-25 | End: 1900-01-01

## 2023-12-14 RX ORDER — PSYLLIUM HUSK 0.4 G
1000-20 CAPSULE ORAL
Qty: 200 | Refills: 3 | Status: ACTIVE | COMMUNITY
Start: 1900-01-01 | End: 1900-01-01

## 2023-12-14 RX ORDER — METFORMIN HYDROCHLORIDE 500 MG/1
500 TABLET, COATED ORAL
Qty: 90 | Refills: 3 | Status: COMPLETED | COMMUNITY
End: 2023-12-14

## 2023-12-16 PROBLEM — E03.9 HYPOTHYROIDISM: Status: ACTIVE | Noted: 2017-11-28

## 2023-12-16 PROBLEM — E11.9 TYPE 2 DIABETES MELLITUS: Status: ACTIVE | Noted: 2017-11-28

## 2023-12-16 PROBLEM — C73 PAPILLARY THYROID CARCINOMA: Status: ACTIVE | Noted: 2017-11-28

## 2023-12-16 LAB — GLUCOSE BLDC GLUCOMTR-MCNC: 95

## 2023-12-16 NOTE — ASSESSMENT
[FreeTextEntry1] : The patient has lost weight after the bypass surgery Her diabetes is well controlled No diabetic complications Her calcium is low Advised to take her calcium regularly with Vitamin D The thyroid cancer is stable The TSH is well suppressed and the Tg very low

## 2023-12-16 NOTE — PHYSICAL EXAM
[Alert] : alert [Well Nourished] : well nourished [No Acute Distress] : no acute distress [Well Developed] : well developed [Normal Sclera/Conjunctiva] : normal sclera/conjunctiva [EOMI] : extra ocular movement intact [No Proptosis] : no proptosis [Normal Oropharynx] : the oropharynx was normal [No Neck Mass] : no neck mass was observed [No Thyroid Nodules] : no palpable thyroid nodules [No Respiratory Distress] : no respiratory distress [No Accessory Muscle Use] : no accessory muscle use [Clear to Auscultation] : lungs were clear to auscultation bilaterally [Normal S1, S2] : normal S1 and S2 [Normal Rate] : heart rate was normal [Regular Rhythm] : with a regular rhythm [No Edema] : no peripheral edema [Pedal Pulses Normal] : the pedal pulses are present [Normal Bowel Sounds] : normal bowel sounds [Not Tender] : non-tender [Not Distended] : not distended [Soft] : abdomen soft [Normal Anterior Cervical Nodes] : no anterior cervical lymphadenopathy [No Spinal Tenderness] : no spinal tenderness [Spine Straight] : spine straight [No Stigmata of Cushings Syndrome] : no stigmata of Cushings Syndrome [Normal Gait] : normal gait [Normal Strength/Tone] : muscle strength and tone were normal [No Rash] : no rash [Acanthosis Nigricans] : no acanthosis nigricans [Normal Reflexes] : deep tendon reflexes were 2+ and symmetric [No Tremors] : no tremors [Oriented x3] : oriented to person, place, and time [de-identified] : Surgical scar present

## 2023-12-16 NOTE — DATA REVIEWED
[FreeTextEntry1] : The FBS and HbA1c indicates good diabetic control. The renal function is fine. There is no microalbuminuria. The lipid panel was fine except for the LDL-C. The TSH and Free T4 were normal. The thyroglobulin was very low. The Calcium was slightly low.

## 2023-12-16 NOTE — HISTORY OF PRESENT ILLNESS
[FreeTextEntry1] : Patient feels well, she is asymptomatic. /Patient feels tired, sleepy, she has no energy. No weight change.  She has lost/gained weight. She is exercising regularly and following the diet. Her blood sugars at home are not/well controlled, they are usually around _ mg/dl. She denies low blood glucose during the night. She denies chest pain, or SOB. Denies numbness, tingling or burning sensation on her extremities. Taking her medications regularly. She has seen the Ophthalmologist recently. She has seen the Podiatrist recently. She has seen the Cardiologist recently.

## 2023-12-27 ENCOUNTER — NON-APPOINTMENT (OUTPATIENT)
Age: 48
End: 2023-12-27

## 2024-01-16 NOTE — ASU DISCHARGE PLAN (ADULT/PEDIATRIC) - CALL YOUR DOCTOR IF YOU HAVE ANY OF THE FOLLOWING:
Informed patient/patient representative about current visitor policy and potential dual occupancy in day surgery department during hospital stay.  
Wound/Surgical Site with redness, or foul smelling discharge or pus/Bleeding that does not stop/Fever greater than (need to indicate Fahrenheit or Celsius)

## 2024-03-14 ENCOUNTER — APPOINTMENT (OUTPATIENT)
Dept: ENDOCRINOLOGY | Facility: CLINIC | Age: 49
End: 2024-03-14

## 2024-04-17 NOTE — H&P PST ADULT - ACCEPTABLE
Previously poorly controlled, likely improved with restarting medication and diet changes (family is not providing as much junk food).  No changes pending review of labs   0

## 2024-06-25 NOTE — ASU PREOP CHECKLIST - TAMPON REMOVED
[General Appearance - Alert] : alert [General Appearance - In No Acute Distress] : in no acute distress [Sclera] : the sclera and conjunctiva were normal [Neck Appearance] : the appearance of the neck was normal [] : no rash [No Focal Deficits] : no focal deficits [Oriented To Time, Place, And Person] : oriented to person, place, and time [FreeTextEntry1] : good skin turgor  n/a

## 2024-07-03 ENCOUNTER — APPOINTMENT (OUTPATIENT)
Dept: ENDOCRINOLOGY | Facility: CLINIC | Age: 49
End: 2024-07-03
Payer: COMMERCIAL

## 2024-07-03 DIAGNOSIS — E11.9 TYPE 2 DIABETES MELLITUS W/OUT COMPLICATIONS: ICD-10-CM

## 2024-07-03 DIAGNOSIS — C73 MALIGNANT NEOPLASM OF THYROID GLAND: ICD-10-CM

## 2024-07-03 DIAGNOSIS — E03.9 HYPOTHYROIDISM, UNSPECIFIED: ICD-10-CM

## 2024-07-03 PROCEDURE — 99443: CPT

## 2024-07-09 LAB
ANION GAP SERPL CALC-SCNC: 13 MMOL/L
BUN SERPL-MCNC: 22 MG/DL
CALCIUM SERPL-MCNC: 9.4 MG/DL
CHLORIDE SERPL-SCNC: 100 MMOL/L
CO2 SERPL-SCNC: 28 MMOL/L
CREAT SERPL-MCNC: 0.48 MG/DL
EGFR: 115 ML/MIN/1.73M2
GLUCOSE SERPL-MCNC: 92 MG/DL
POTASSIUM SERPL-SCNC: 4.7 MMOL/L
SODIUM SERPL-SCNC: 140 MMOL/L
T4 FREE SERPL-MCNC: 2.1 NG/DL
TSH SERPL-ACNC: 3.28 UIU/ML

## 2024-09-12 ENCOUNTER — APPOINTMENT (OUTPATIENT)
Dept: OBGYN | Facility: CLINIC | Age: 49
End: 2024-09-12
Payer: COMMERCIAL

## 2024-09-12 VITALS
DIASTOLIC BLOOD PRESSURE: 73 MMHG | SYSTOLIC BLOOD PRESSURE: 105 MMHG | BODY MASS INDEX: 26.89 KG/M2 | WEIGHT: 147 LBS | OXYGEN SATURATION: 99 % | HEART RATE: 97 BPM

## 2024-09-12 PROCEDURE — 99204 OFFICE O/P NEW MOD 45 MIN: CPT

## 2024-09-12 NOTE — PHYSICAL EXAM
[Appropriately responsive] : appropriately responsive [Alert] : alert [No Acute Distress] : no acute distress [No Murmurs] : no murmurs [No Mass] : no mass [Oriented x3] : oriented x3

## 2024-09-12 NOTE — PLAN
[FreeTextEntry1] : rec endometrial biopsy and pelvic sono has to go to work now for pelvic sono on monday and endometrial biopsy on tuesday

## 2024-09-12 NOTE — HISTORY OF PRESENT ILLNESS
[FreeTextEntry1] : new patient  here becasue she has been having vaginal bleeding intermittent for the last 5 months  perimenopausal?  hx thyroid cancer

## 2024-09-16 ENCOUNTER — LABORATORY RESULT (OUTPATIENT)
Age: 49
End: 2024-09-16

## 2024-09-16 ENCOUNTER — ASOB RESULT (OUTPATIENT)
Age: 49
End: 2024-09-16

## 2024-09-16 ENCOUNTER — APPOINTMENT (OUTPATIENT)
Dept: OBGYN | Facility: CLINIC | Age: 49
End: 2024-09-16
Payer: COMMERCIAL

## 2024-09-16 PROCEDURE — 76830 TRANSVAGINAL US NON-OB: CPT

## 2024-09-17 ENCOUNTER — APPOINTMENT (OUTPATIENT)
Dept: OBGYN | Facility: CLINIC | Age: 49
End: 2024-09-17
Payer: COMMERCIAL

## 2024-09-17 VITALS — SYSTOLIC BLOOD PRESSURE: 116 MMHG | BODY MASS INDEX: 27.07 KG/M2 | WEIGHT: 148 LBS | DIASTOLIC BLOOD PRESSURE: 81 MMHG

## 2024-09-17 DIAGNOSIS — N93.9 ABNORMAL UTERINE AND VAGINAL BLEEDING, UNSPECIFIED: ICD-10-CM

## 2024-09-17 PROCEDURE — 58100 BIOPSY OF UTERUS LINING: CPT

## 2024-09-17 NOTE — PROCEDURE
[Endometrial Biopsy] : Endometrial biopsy [Time out performed] : Pre-procedure time out performed.  Patient's name, date of birth and procedure confirmed. [Consent Obtained] : Consent obtained [Irregular Bleeding] : irregular uterine bleeding [Thickened Endometrium] : thickened endometrium [Risks] : risks [Benefits] : benefits [Alternatives] : alternatives [Patient] : patient [Infection] : infection [Bleeding] : bleeding [Allergic Reaction] : allergic reaction [Uterine Perforation] : uterine perforation [Pain] : pain [Negative] : negative pregnancy test [No Premedication] : No premedication [Betadine] : Betadine [None] : none [Tenaculum] : Tenaculum [Easy Passage] : Easy passage [Anteverted] : anteverted [Specimen Collected] : collected [Sent to Pathology] : placed in buffered formalin and sent for pathology [Tolerated Well] : Patient tolerated the procedure well [No Complications] : No complications

## 2024-12-19 ENCOUNTER — APPOINTMENT (OUTPATIENT)
Dept: SURGERY | Facility: CLINIC | Age: 49
End: 2024-12-19

## 2024-12-24 PROBLEM — F10.90 ALCOHOL USE: Status: ACTIVE | Noted: 2019-12-23

## 2025-01-23 ENCOUNTER — APPOINTMENT (OUTPATIENT)
Dept: ENDOCRINOLOGY | Facility: CLINIC | Age: 50
End: 2025-01-23
Payer: COMMERCIAL

## 2025-01-23 VITALS
HEIGHT: 62 IN | RESPIRATION RATE: 16 BRPM | WEIGHT: 148 LBS | OXYGEN SATURATION: 94 % | SYSTOLIC BLOOD PRESSURE: 129 MMHG | HEART RATE: 88 BPM | BODY MASS INDEX: 27.23 KG/M2 | DIASTOLIC BLOOD PRESSURE: 82 MMHG | TEMPERATURE: 97.7 F

## 2025-01-23 DIAGNOSIS — C73 MALIGNANT NEOPLASM OF THYROID GLAND: ICD-10-CM

## 2025-01-23 DIAGNOSIS — E11.9 TYPE 2 DIABETES MELLITUS W/OUT COMPLICATIONS: ICD-10-CM

## 2025-01-23 DIAGNOSIS — E03.9 HYPOTHYROIDISM, UNSPECIFIED: ICD-10-CM

## 2025-01-23 LAB — GLUCOSE BLDC GLUCOMTR-MCNC: 170

## 2025-01-23 PROCEDURE — 82962 GLUCOSE BLOOD TEST: CPT

## 2025-01-23 PROCEDURE — G2211 COMPLEX E/M VISIT ADD ON: CPT | Mod: NC

## 2025-01-23 PROCEDURE — 99214 OFFICE O/P EST MOD 30 MIN: CPT

## 2025-03-13 ENCOUNTER — APPOINTMENT (OUTPATIENT)
Dept: ENDOCRINOLOGY | Facility: CLINIC | Age: 50
End: 2025-03-13

## 2025-04-02 ENCOUNTER — NON-APPOINTMENT (OUTPATIENT)
Age: 50
End: 2025-04-02

## 2025-04-03 ENCOUNTER — APPOINTMENT (OUTPATIENT)
Dept: ENDOCRINOLOGY | Facility: CLINIC | Age: 50
End: 2025-04-03
Payer: COMMERCIAL

## 2025-04-03 VITALS
HEIGHT: 62 IN | HEART RATE: 82 BPM | TEMPERATURE: 97.3 F | SYSTOLIC BLOOD PRESSURE: 111 MMHG | OXYGEN SATURATION: 99 % | WEIGHT: 147 LBS | BODY MASS INDEX: 27.05 KG/M2 | RESPIRATION RATE: 16 BRPM | DIASTOLIC BLOOD PRESSURE: 76 MMHG

## 2025-04-03 DIAGNOSIS — C73 MALIGNANT NEOPLASM OF THYROID GLAND: ICD-10-CM

## 2025-04-03 DIAGNOSIS — E03.9 HYPOTHYROIDISM, UNSPECIFIED: ICD-10-CM

## 2025-04-03 DIAGNOSIS — E11.9 TYPE 2 DIABETES MELLITUS W/OUT COMPLICATIONS: ICD-10-CM

## 2025-04-03 PROCEDURE — 99214 OFFICE O/P EST MOD 30 MIN: CPT

## 2025-04-03 PROCEDURE — G2211 COMPLEX E/M VISIT ADD ON: CPT | Mod: NC

## 2025-04-03 PROCEDURE — 82962 GLUCOSE BLOOD TEST: CPT

## 2025-04-05 LAB — GLUCOSE BLDC GLUCOMTR-MCNC: 166

## 2025-07-17 ENCOUNTER — APPOINTMENT (OUTPATIENT)
Dept: ENDOCRINOLOGY | Facility: CLINIC | Age: 50
End: 2025-07-17

## 2025-08-29 DIAGNOSIS — Z23 ENCOUNTER FOR IMMUNIZATION: ICD-10-CM

## 2025-09-18 ENCOUNTER — APPOINTMENT (OUTPATIENT)
Dept: ENDOCRINOLOGY | Facility: CLINIC | Age: 50
End: 2025-09-18

## (undated) DEVICE — GOWN XL

## (undated) DEVICE — PACK MINOR NO DRAPE

## (undated) DEVICE — DRSG MASTISOL

## (undated) DEVICE — GLV 7 PROTEXIS

## (undated) DEVICE — PREP BETADINE SPONGE STICKS

## (undated) DEVICE — WRAP COMPRESSION CALF MED

## (undated) DEVICE — SOL IRR POUR H2O 1500ML

## (undated) DEVICE — NDL HYPO REGULAR BEVEL 25G X 1.5"

## (undated) DEVICE — FOR-ESU VALLEYLAB T7E15008DX: Type: DURABLE MEDICAL EQUIPMENT

## (undated) DEVICE — DRSG TEGADERM 4X4.75"

## (undated) DEVICE — DRAPE LIGHT HANDLE COVER BLUE

## (undated) DEVICE — DRAPE LAPAROTOMY TRANSVERSE

## (undated) DEVICE — SUT POLYSORB 4-0 27" P-12 UNDYED

## (undated) DEVICE — SUT SOFSILK 2-0 30" V-20

## (undated) DEVICE — NDL HYPO SAFE 25G X 1.5"

## (undated) DEVICE — SOL IRR POUR H2O 500ML

## (undated) DEVICE — GLV 7.5 PROTEXIS

## (undated) DEVICE — DRSG 4X4

## (undated) DEVICE — ELCTR GROUNDING PAD ADULT COVIDIEN

## (undated) DEVICE — BLANKET WARMER LOWER ADULT

## (undated) DEVICE — DRAPE HALF SHEET 40X57"

## (undated) DEVICE — PREP CHLORAPREP ORANGE 2PCT 26ML

## (undated) DEVICE — SUT POLYSORB 3-0 30" V-20 UNDYED

## (undated) DEVICE — SYR LUER LOK 10CC